# Patient Record
Sex: FEMALE | Race: WHITE | NOT HISPANIC OR LATINO | Employment: OTHER | URBAN - METROPOLITAN AREA
[De-identification: names, ages, dates, MRNs, and addresses within clinical notes are randomized per-mention and may not be internally consistent; named-entity substitution may affect disease eponyms.]

---

## 2020-03-02 ENCOUNTER — LAB (OUTPATIENT)
Dept: LAB | Facility: HOSPITAL | Age: 70
End: 2020-03-02

## 2020-03-02 ENCOUNTER — HOSPITAL ENCOUNTER (OUTPATIENT)
Dept: GENERAL RADIOLOGY | Facility: HOSPITAL | Age: 70
Discharge: HOME | End: 2020-03-02

## 2020-03-02 ENCOUNTER — HOSPITAL ENCOUNTER (OUTPATIENT)
Dept: CARDIOLOGY | Facility: HOSPITAL | Age: 70
Discharge: HOME | End: 2020-03-02
Admitting: FAMILY MEDICINE

## 2020-03-02 ENCOUNTER — OFFICE VISIT (OUTPATIENT)
Dept: FAMILY MEDICINE CLINIC | Facility: CLINIC | Age: 70
End: 2020-03-02

## 2020-03-02 VITALS
TEMPERATURE: 97.9 F | WEIGHT: 128.6 LBS | HEIGHT: 68 IN | RESPIRATION RATE: 18 BRPM | BODY MASS INDEX: 19.49 KG/M2 | HEART RATE: 71 BPM | OXYGEN SATURATION: 98 % | DIASTOLIC BLOOD PRESSURE: 86 MMHG | SYSTOLIC BLOOD PRESSURE: 136 MMHG

## 2020-03-02 DIAGNOSIS — I10 ESSENTIAL HYPERTENSION: ICD-10-CM

## 2020-03-02 DIAGNOSIS — M19.90 ARTHRITIS: ICD-10-CM

## 2020-03-02 DIAGNOSIS — I10 ESSENTIAL HYPERTENSION: Primary | ICD-10-CM

## 2020-03-02 LAB
ALBUMIN SERPL-MCNC: 4.3 G/DL (ref 3.5–5.2)
ALBUMIN/GLOB SERPL: 1.8 G/DL
ALP SERPL-CCNC: 54 U/L (ref 39–117)
ALT SERPL W P-5'-P-CCNC: 17 U/L (ref 1–33)
ANION GAP SERPL CALCULATED.3IONS-SCNC: 12.3 MMOL/L (ref 5–15)
AST SERPL-CCNC: 23 U/L (ref 1–32)
BASOPHILS # BLD AUTO: 0.05 10*3/MM3 (ref 0–0.2)
BASOPHILS NFR BLD AUTO: 0.7 % (ref 0–1.5)
BILIRUB SERPL-MCNC: 0.2 MG/DL (ref 0.2–1.2)
BUN BLD-MCNC: 21 MG/DL (ref 8–23)
BUN/CREAT SERPL: 22.3 (ref 7–25)
CALCIUM SPEC-SCNC: 9 MG/DL (ref 8.6–10.5)
CHLORIDE SERPL-SCNC: 100 MMOL/L (ref 98–107)
CHOLEST SERPL-MCNC: 157 MG/DL (ref 0–200)
CO2 SERPL-SCNC: 26.7 MMOL/L (ref 22–29)
CREAT BLD-MCNC: 0.94 MG/DL (ref 0.57–1)
DEPRECATED RDW RBC AUTO: 42.6 FL (ref 37–54)
EOSINOPHIL # BLD AUTO: 0.13 10*3/MM3 (ref 0–0.4)
EOSINOPHIL NFR BLD AUTO: 1.8 % (ref 0.3–6.2)
ERYTHROCYTE [DISTWIDTH] IN BLOOD BY AUTOMATED COUNT: 11.8 % (ref 12.3–15.4)
GFR SERPL CREATININE-BSD FRML MDRD: 59 ML/MIN/1.73
GLOBULIN UR ELPH-MCNC: 2.4 GM/DL
GLUCOSE BLD-MCNC: 88 MG/DL (ref 65–99)
HCT VFR BLD AUTO: 35.1 % (ref 34–46.6)
HDLC SERPL-MCNC: 55 MG/DL (ref 40–60)
HGB BLD-MCNC: 12.1 G/DL (ref 12–15.9)
IMM GRANULOCYTES # BLD AUTO: 0 10*3/MM3 (ref 0–0.05)
IMM GRANULOCYTES NFR BLD AUTO: 0 % (ref 0–0.5)
LDLC SERPL CALC-MCNC: 69 MG/DL (ref 0–100)
LDLC/HDLC SERPL: 1.25 {RATIO}
LYMPHOCYTES # BLD AUTO: 2.28 10*3/MM3 (ref 0.7–3.1)
LYMPHOCYTES NFR BLD AUTO: 32.3 % (ref 19.6–45.3)
MCH RBC QN AUTO: 33.7 PG (ref 26.6–33)
MCHC RBC AUTO-ENTMCNC: 34.5 G/DL (ref 31.5–35.7)
MCV RBC AUTO: 97.8 FL (ref 79–97)
MONOCYTES # BLD AUTO: 0.64 10*3/MM3 (ref 0.1–0.9)
MONOCYTES NFR BLD AUTO: 9.1 % (ref 5–12)
NEUTROPHILS # BLD AUTO: 3.95 10*3/MM3 (ref 1.7–7)
NEUTROPHILS NFR BLD AUTO: 56.1 % (ref 42.7–76)
NRBC BLD AUTO-RTO: 0 /100 WBC (ref 0–0.2)
PLATELET # BLD AUTO: 295 10*3/MM3 (ref 140–450)
PMV BLD AUTO: 9.9 FL (ref 6–12)
POTASSIUM BLD-SCNC: 4.4 MMOL/L (ref 3.5–5.2)
PROT SERPL-MCNC: 6.7 G/DL (ref 6–8.5)
RBC # BLD AUTO: 3.59 10*6/MM3 (ref 3.77–5.28)
SODIUM BLD-SCNC: 139 MMOL/L (ref 136–145)
TRIGL SERPL-MCNC: 167 MG/DL (ref 0–150)
VLDLC SERPL-MCNC: 33.4 MG/DL (ref 5–40)
WBC NRBC COR # BLD: 7.05 10*3/MM3 (ref 3.4–10.8)

## 2020-03-02 PROCEDURE — 93005 ELECTROCARDIOGRAM TRACING: CPT | Performed by: FAMILY MEDICINE

## 2020-03-02 PROCEDURE — 99203 OFFICE O/P NEW LOW 30 MIN: CPT | Performed by: FAMILY MEDICINE

## 2020-03-02 PROCEDURE — 36415 COLL VENOUS BLD VENIPUNCTURE: CPT

## 2020-03-02 PROCEDURE — 80061 LIPID PANEL: CPT

## 2020-03-02 PROCEDURE — 71046 X-RAY EXAM CHEST 2 VIEWS: CPT

## 2020-03-02 PROCEDURE — 85025 COMPLETE CBC W/AUTO DIFF WBC: CPT

## 2020-03-02 PROCEDURE — 80053 COMPREHEN METABOLIC PANEL: CPT

## 2020-03-02 RX ORDER — METOPROLOL TARTRATE 50 MG/1
50 TABLET, FILM COATED ORAL 2 TIMES DAILY
COMMUNITY
End: 2020-03-02 | Stop reason: SDUPTHER

## 2020-03-02 RX ORDER — METOPROLOL TARTRATE 50 MG/1
50 TABLET, FILM COATED ORAL 2 TIMES DAILY
Qty: 180 TABLET | Refills: 3 | Status: SHIPPED | OUTPATIENT
Start: 2020-03-02 | End: 2021-03-17

## 2020-03-02 NOTE — PROGRESS NOTES
"Subjective   Aparna Wilson is a 69 y.o. female.     Chief Complaint   Patient presents with   • Hypertension     Worse in the morning       HPI  Chief complaint: Hypertension, arthritis    The patient is a 69-year-old white female states that she has been having some headaches of late.  She took her blood pressure recently.  Her systolic pressure was 170.  Patient states that she started taking her 's metoprolol.  She has been states she has been taking 50 mg twice a day.  States that her headaches are better.  States she would like to continue with her antihypertensives.    Patient also has a history of arthritis for which she uses ibuprofen or Tylenol.  She states that it bothers her most in the morning.        The following portions of the patient's history were reviewed and updated as appropriate: allergies, current medications, past family history, past medical history, past social history, past surgical history and problem list.    Review of Systems   Constitutional: Negative for chills and fever.   HENT: Negative for congestion, sinus pressure and swollen glands.    Eyes: Negative for blurred vision and pain.   Respiratory: Negative for cough and shortness of breath.    Cardiovascular: Negative for chest pain and leg swelling.   Gastrointestinal: Negative for abdominal pain, nausea and indigestion.   Endocrine: Negative for cold intolerance, heat intolerance, polydipsia, polyphagia and polyuria.   Musculoskeletal: Positive for arthralgias.   Skin: Negative for dry skin, rash and bruise.   Neurological: Positive for headache. Negative for dizziness and syncope.   Psychiatric/Behavioral: Negative for dysphoric mood and stress.       Objective     /86 (BP Location: Left arm, Patient Position: Sitting, Cuff Size: Adult)   Pulse 71   Temp 97.9 °F (36.6 °C) (Oral)   Resp 18   Ht 171.5 cm (67.5\")   Wt 58.3 kg (128 lb 9.6 oz)   SpO2 98%   BMI 19.84 kg/m²     Physical Exam   Constitutional: She " is oriented to person, place, and time. She appears well-developed and well-nourished.   HENT:   Head: Normocephalic and atraumatic.   Eyes: Pupils are equal, round, and reactive to light. Conjunctivae and EOM are normal.   Neck: Normal range of motion. Neck supple.   Cardiovascular: Normal rate, regular rhythm, normal heart sounds and intact distal pulses.   Pulmonary/Chest: Effort normal and breath sounds normal.   Abdominal: Soft. Bowel sounds are normal.   Musculoskeletal: Normal range of motion.   Neurological: She is alert and oriented to person, place, and time.   Skin: Skin is warm and dry.   Psychiatric: She has a normal mood and affect. Her behavior is normal.   Nursing note and vitals reviewed.        Assessment/Plan   Aparna was seen today for hypertension.    Diagnoses and all orders for this visit:    Essential hypertension  -     CBC & Differential; Future  -     Comprehensive Metabolic Panel; Future  -     Lipid Panel; Future  -     ECG 12 Lead; Future  -     XR Chest PA & Lateral; Future    Arthritis    Other orders  -     metoprolol tartrate (LOPRESSOR) 50 MG tablet; Take 1 tablet by mouth 2 (Two) Times a Day.      Patient Instructions   Continue your current medications and treatment.    Have the follow up labs done and call for results.    Follow up in the office in 6 months.      Dontae Aranda Jr., MD    03/02/20

## 2020-03-08 PROCEDURE — 93010 ELECTROCARDIOGRAM REPORT: CPT | Performed by: INTERNAL MEDICINE

## 2020-03-13 ENCOUNTER — TELEPHONE (OUTPATIENT)
Dept: FAMILY MEDICINE CLINIC | Facility: CLINIC | Age: 70
End: 2020-03-13

## 2020-09-23 ENCOUNTER — OFFICE VISIT (OUTPATIENT)
Dept: FAMILY MEDICINE CLINIC | Facility: CLINIC | Age: 70
End: 2020-09-23

## 2020-09-23 VITALS
HEART RATE: 56 BPM | HEIGHT: 68 IN | SYSTOLIC BLOOD PRESSURE: 130 MMHG | DIASTOLIC BLOOD PRESSURE: 83 MMHG | TEMPERATURE: 97.7 F | WEIGHT: 137 LBS | RESPIRATION RATE: 20 BRPM | BODY MASS INDEX: 20.76 KG/M2 | OXYGEN SATURATION: 100 %

## 2020-09-23 DIAGNOSIS — M19.90 ARTHRITIS: ICD-10-CM

## 2020-09-23 DIAGNOSIS — Z00.00 ENCOUNTER FOR MEDICARE ANNUAL WELLNESS EXAM: ICD-10-CM

## 2020-09-23 DIAGNOSIS — I10 ESSENTIAL HYPERTENSION: Primary | ICD-10-CM

## 2020-09-23 DIAGNOSIS — S00.81XA ABRASION OF FACE, INITIAL ENCOUNTER: ICD-10-CM
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
## 2020-09-23 PROCEDURE — 99213 OFFICE O/P EST LOW 20 MIN: CPT | Performed by: FAMILY MEDICINE

## 2020-09-23 PROCEDURE — G0438 PPPS, INITIAL VISIT: HCPCS | Performed by: FAMILY MEDICINE

## 2020-09-23 NOTE — PATIENT INSTRUCTIONS
Continue your current medications and treatment.    Follow up in the office in 1 year.    Get a mammogram.

## 2020-09-23 NOTE — PROGRESS NOTES
The ABCs of the Annual Wellness Visit  Initial Medicare Wellness Visit    Chief Complaint   Patient presents with   • Medicare Wellness-Initial Visit   • Hypertension   • Arthritis       Subjective   History of Present Illness:  Aparna Wilson is a 70 y.o. female who presents for an Initial Medicare Wellness Visit.    HEALTH RISK ASSESSMENT    Recent Hospitalizations:  No hospitalization(s) within the last year.    Current Medical Providers:  Patient Care Team:  Dontae Aranda Jr., MD as PCP - General (Family Medicine)  Dontae Aranda Jr., MD as PCP - Claims Attributed    Smoking Status:  Social History     Tobacco Use   Smoking Status Former Smoker   Smokeless Tobacco Never Used       Alcohol Consumption:  Social History     Substance and Sexual Activity   Alcohol Use Defer       Depression Screen:   PHQ-2/PHQ-9 Depression Screening 9/23/2020   Little interest or pleasure in doing things 0   Feeling down, depressed, or hopeless 0   Total Score 0       Fall Risk Screen:  WENDY Fall Risk Assessment was completed, and patient is at HIGH risk for falls. Assessment completed on:9/23/2020    Health Habits and Functional and Cognitive Screening:  Functional & Cognitive Status 9/23/2020   Do you have difficulty preparing food and eating? No   Do you have difficulty bathing yourself, getting dressed or grooming yourself? No   Do you have difficulty using the toilet? No   Do you have difficulty moving around from place to place? No   Do you have trouble with steps or getting out of a bed or a chair? No   Current Diet Well Balanced Diet   Dental Exam Up to date   Exercise (times per week) 7 times per week   Current Exercise Activities Include Yard Work   Do you need help using the phone?  No   Are you deaf or do you have serious difficulty hearing?  No   Do you need help with transportation? No   Do you need help shopping? No   Do you need help preparing meals?  No   Do you need help with housework?  No   Do you need  help with laundry? No   Do you need help taking your medications? No   Do you need help managing money? No   Do you ever drive or ride in a car without wearing a seat belt? No   Have you felt unusual stress, anger or loneliness in the last month? No   Who do you live with? Spouse   If you need help, do you have trouble finding someone available to you? No   Have you been bothered in the last four weeks by sexual problems? No   Do you have difficulty concentrating, remembering or making decisions? No         Does the patient have evidence of cognitive impairment? No    Asprin use counseling:Does not need ASA (and currently is not on it)    Age-appropriate Screening Schedule:  Refer to the list below for future screening recommendations based on patient's age, sex and/or medical conditions. Orders for these recommended tests are listed in the plan section. The patient has been provided with a written plan.    Health Maintenance   Topic Date Due   • MAMMOGRAM  09/23/2020 (Originally 7/25/2018)   • TDAP/TD VACCINES (1 - Tdap) 09/23/2020 (Originally 6/5/1969)   • ZOSTER VACCINE (1 of 2) 03/02/2021 (Originally 6/5/2000)   • INFLUENZA VACCINE  09/23/2021 (Originally 8/1/2020)   • COLONOSCOPY  03/02/2026          The following portions of the patient's history were reviewed and updated as appropriate: allergies, current medications, past family history, past medical history, past social history, past surgical history and problem list.    Outpatient Medications Prior to Visit   Medication Sig Dispense Refill   • metoprolol tartrate (LOPRESSOR) 50 MG tablet Take 1 tablet by mouth 2 (Two) Times a Day. 180 tablet 3     No facility-administered medications prior to visit.        Patient Active Problem List   Diagnosis   • Essential hypertension   • Arthritis   • Facial abrasion   • Fall       Advanced Care Planning:  ACP discussion was held with the patient during this visit. Patient does not have an advance directive,  "information provided.    Review of Systems    Compared to one year ago, the patient feels her physical health is the same.  Compared to one year ago, the patient feels her mental health is the same.    Reviewed chart for potential of high risk medication in the elderly: yes  Reviewed chart for potential of harmful drug interactions in the elderly:yes    Objective         Vitals:    09/23/20 1405   BP: 130/83   BP Location: Left arm   Patient Position: Sitting   Cuff Size: Adult   Pulse: 56   Resp: 20   Temp: 97.7 °F (36.5 °C)   TempSrc: Infrared   SpO2: 100%   Weight: 62.1 kg (137 lb)   Height: 171.5 cm (67.5\")       Body mass index is 21.14 kg/m².  Discussed the patient's BMI with her. The BMI is in the acceptable range.    Physical Exam  Vitals signs and nursing note reviewed.   Constitutional:       Appearance: Normal appearance. She is well-developed and normal weight.   HENT:      Head: Normocephalic and atraumatic.      Nose: Nose normal.      Mouth/Throat:      Mouth: Mucous membranes are dry.      Pharynx: Oropharynx is clear.   Eyes:      Extraocular Movements: Extraocular movements intact.      Pupils: Pupils are equal, round, and reactive to light.   Neck:      Musculoskeletal: Normal range of motion and neck supple.   Cardiovascular:      Rate and Rhythm: Normal rate and regular rhythm.      Pulses: Normal pulses.      Heart sounds: Normal heart sounds.   Pulmonary:      Effort: Pulmonary effort is normal.      Breath sounds: Normal breath sounds.   Abdominal:      General: Abdomen is flat. Bowel sounds are normal.      Palpations: Abdomen is soft.   Musculoskeletal: Normal range of motion.      Comments: Osteoarthritic changes of the hands and other joints   Skin:     General: Skin is warm and dry.   Neurological:      Mental Status: She is alert and oriented to person, place, and time.   Psychiatric:         Behavior: Behavior normal.         Thought Content: Thought content normal.         Judgment: " Judgment normal.               Assessment/Plan   Medicare Risks and Personalized Health Plan  CMS Preventative Services Quick Reference  Advance Directive Discussion    The above risks/problems have been discussed with the patient.  Pertinent information has been shared with the patient in the After Visit Summary.  Follow up plans and orders are seen below in the Assessment/Plan Section.    Diagnoses and all orders for this visit:    1. Encounter for Medicare annual wellness exam (Primary)    2. Abrasion of face, initial encounter    3. Arthritis    4. Essential hypertension      Follow Up:  No follow-ups on file.     An After Visit Summary and PPPS were given to the patient.

## 2020-09-23 NOTE — PROGRESS NOTES
"Subjective   Aparna Wilson is a 70 y.o. female.     Chief Complaint   Patient presents with   • Medicare Wellness-Initial Visit   • Hypertension   • Arthritis       HPI  Chief complaint: Hypertension, arthritis    The patient is a 70-year-old white female comes in for follow-up and maintenance of her current problems which include    1.  Hypertension-stable-patient on metoprolol 50 mg twice a day.  She denied headache lightheadedness dizziness or chest pain.    2.  Arthritis-stable-patient is currently on over-the-counter anti-inflammatories.  Patient has arthritis involving all of her joints including her hands.  She states that the medications help with her joint pain.    3.  Facial abrasion-new-patient recently walked out in her yard in the dark and tripped over weedeater that she left out.  She fell on her face.  She sustained a facial abrasion.  She states that she subluxed her front teeth.  She is can have to have these removed.  She is being followed by dentist.        The following portions of the patient's history were reviewed and updated as appropriate: allergies, current medications, past family history, past medical history, past social history, past surgical history and problem list.    Review of Systems    Objective     /83 (BP Location: Left arm, Patient Position: Sitting, Cuff Size: Adult)   Pulse 56   Temp 97.7 °F (36.5 °C) (Infrared)   Resp 20   Ht 171.5 cm (67.5\")   Wt 62.1 kg (137 lb)   SpO2 100%   BMI 21.14 kg/m²     Physical Exam  Vitals signs and nursing note reviewed.   Constitutional:       Appearance: Normal appearance. She is well-developed and normal weight.   HENT:      Head: Normocephalic and atraumatic.      Nose: Nose normal.      Mouth/Throat:      Mouth: Mucous membranes are dry.      Pharynx: Oropharynx is clear.   Eyes:      Extraocular Movements: Extraocular movements intact.      Pupils: Pupils are equal, round, and reactive to light.   Neck:      " Musculoskeletal: Normal range of motion and neck supple.   Cardiovascular:      Rate and Rhythm: Normal rate and regular rhythm.      Heart sounds: Normal heart sounds.   Pulmonary:      Effort: Pulmonary effort is normal.      Breath sounds: Normal breath sounds.   Abdominal:      General: Abdomen is flat. Bowel sounds are normal.      Palpations: Abdomen is soft.   Musculoskeletal: Normal range of motion.      Comments: Arthritic changes of the hands and other joints   Skin:     General: Skin is warm and dry.      Comments: Abrasions of the upper lip.   Neurological:      Mental Status: She is alert and oriented to person, place, and time.   Psychiatric:         Behavior: Behavior normal.         Thought Content: Thought content normal.         Judgment: Judgment normal.           Assessment/Plan   Aparna was seen today for medicare wellness-initial visit, hypertension and arthritis.    Diagnoses and all orders for this visit:    Essential hypertension    Abrasion of face, initial encounter    Arthritis    Encounter for Medicare annual wellness exam      Patient Instructions   Continue your current medications and treatment.    Follow up in the office in 1 year.    Get a mammogram.        Dontae Aranda Jr., MD    09/23/20

## 2021-03-17 RX ORDER — METOPROLOL TARTRATE 50 MG/1
TABLET, FILM COATED ORAL
Qty: 180 TABLET | Refills: 0 | Status: SHIPPED | OUTPATIENT
Start: 2021-03-17 | End: 2021-06-22

## 2021-03-24 ENCOUNTER — LAB (OUTPATIENT)
Dept: LAB | Facility: HOSPITAL | Age: 71
End: 2021-03-24

## 2021-03-24 ENCOUNTER — OFFICE VISIT (OUTPATIENT)
Dept: FAMILY MEDICINE CLINIC | Facility: CLINIC | Age: 71
End: 2021-03-24

## 2021-03-24 VITALS
BODY MASS INDEX: 21.04 KG/M2 | TEMPERATURE: 97.1 F | HEART RATE: 67 BPM | OXYGEN SATURATION: 98 % | HEIGHT: 68 IN | WEIGHT: 138.8 LBS | SYSTOLIC BLOOD PRESSURE: 136 MMHG | RESPIRATION RATE: 20 BRPM | DIASTOLIC BLOOD PRESSURE: 83 MMHG

## 2021-03-24 DIAGNOSIS — I10 ESSENTIAL HYPERTENSION: Primary | ICD-10-CM

## 2021-03-24 DIAGNOSIS — Z13.9 ENCOUNTER FOR HEALTH-RELATED SCREENING: ICD-10-CM
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
## 2021-03-24 PROBLEM — S00.81XA FACIAL ABRASION: Status: RESOLVED | Noted: 2020-09-23 | Resolved: 2021-03-24

## 2021-03-24 LAB
ANION GAP SERPL CALCULATED.3IONS-SCNC: 6.1 MMOL/L (ref 5–15)
BUN SERPL-MCNC: 19 MG/DL (ref 8–23)
BUN/CREAT SERPL: 18.8 (ref 7–25)
CALCIUM SPEC-SCNC: 8.7 MG/DL (ref 8.6–10.5)
CHLORIDE SERPL-SCNC: 102 MMOL/L (ref 98–107)
CHOLEST SERPL-MCNC: 170 MG/DL (ref 0–200)
CO2 SERPL-SCNC: 27.9 MMOL/L (ref 22–29)
CREAT SERPL-MCNC: 1.01 MG/DL (ref 0.57–1)
GFR SERPL CREATININE-BSD FRML MDRD: 54 ML/MIN/1.73
GLUCOSE SERPL-MCNC: 90 MG/DL (ref 65–99)
HDLC SERPL-MCNC: 48 MG/DL (ref 40–60)
LDLC SERPL CALC-MCNC: 90 MG/DL (ref 0–100)
LDLC/HDLC SERPL: 1.76 {RATIO}
POTASSIUM SERPL-SCNC: 4.8 MMOL/L (ref 3.5–5.2)
SODIUM SERPL-SCNC: 136 MMOL/L (ref 136–145)
TRIGL SERPL-MCNC: 188 MG/DL (ref 0–150)
VLDLC SERPL-MCNC: 32 MG/DL (ref 5–40)

## 2021-03-24 PROCEDURE — 80061 LIPID PANEL: CPT

## 2021-03-24 PROCEDURE — 36415 COLL VENOUS BLD VENIPUNCTURE: CPT

## 2021-03-24 PROCEDURE — 80048 BASIC METABOLIC PNL TOTAL CA: CPT

## 2021-03-24 PROCEDURE — 99213 OFFICE O/P EST LOW 20 MIN: CPT | Performed by: FAMILY MEDICINE

## 2021-03-24 NOTE — PATIENT INSTRUCTIONS
Continue your current medications and treatment.    Have the follow up labs and the mammogram.    Follow up in the office in 6 months..

## 2021-03-24 NOTE — PROGRESS NOTES
"Subjective   Aparna Wilson is a 70 y.o. female.     Chief Complaint   Patient presents with   • Hypertension     6 month followup       HPI  Chief complaint: Hypertension    Patient is a 70-year-old white female comes in for follow-up and maintenance of her current problems which include    1.  Hypertension stable-patient on metoprolol 50 mg twice a day.  She denied headache lightheadedness dizziness or chest pain.        The following portions of the patient's history were reviewed and updated as appropriate: allergies, current medications, past family history, past medical history, past social history, past surgical history and problem list.    Review of Systems    Objective     /83 (BP Location: Left arm, Patient Position: Sitting, Cuff Size: Adult)   Pulse 67   Temp 97.1 °F (36.2 °C) (Infrared)   Resp 20   Ht 171.5 cm (67.5\")   Wt 63 kg (138 lb 12.8 oz)   SpO2 98%   BMI 21.42 kg/m²     Physical Exam  Vitals and nursing note reviewed.   Constitutional:       Appearance: She is well-developed and normal weight.   HENT:      Head: Normocephalic and atraumatic.      Nose: Nose normal.      Mouth/Throat:      Mouth: Mucous membranes are moist.      Pharynx: Oropharynx is clear.   Eyes:      Conjunctiva/sclera: Conjunctivae normal.      Pupils: Pupils are equal, round, and reactive to light.   Cardiovascular:      Rate and Rhythm: Normal rate and regular rhythm.      Pulses: Normal pulses.      Heart sounds: Normal heart sounds.   Pulmonary:      Effort: Pulmonary effort is normal.      Breath sounds: Normal breath sounds.   Abdominal:      General: Abdomen is flat. Bowel sounds are normal.      Palpations: Abdomen is soft.   Musculoskeletal:         General: Normal range of motion.      Cervical back: Normal range of motion and neck supple.   Skin:     General: Skin is warm and dry.   Neurological:      Mental Status: She is alert and oriented to person, place, and time.   Psychiatric:         " Behavior: Behavior normal.         Thought Content: Thought content normal.         Judgment: Judgment normal.       Lipid Panel (03/02/2020 13:06)  Comprehensive Metabolic Panel (03/02/2020 13:06)  CBC & Differential (03/02/2020 13:06)      Assessment/Plan   Diagnoses and all orders for this visit:    1. Essential hypertension (Primary)    2. Encounter for health-related screening  -     Mammo Screening Digital Tomosynthesis Bilateral With CAD; Future      Patient Instructions   Continue your current medications and treatment.    Have the follow up labs and the mammogram.    Follow up in the office in 6 months..      Dontae Aranda Jr., MD    03/24/21

## 2021-04-16 ENCOUNTER — DOCUMENTATION (OUTPATIENT)
Dept: FAMILY MEDICINE CLINIC | Facility: CLINIC | Age: 71
End: 2021-04-16

## 2021-04-16 NOTE — PROGRESS NOTES
Called pt to see if she has scheduled mammogram, LVM for pt to call office and let us know if she has scheduled this. Ok hub to read

## 2021-06-22 RX ORDER — METOPROLOL TARTRATE 50 MG/1
TABLET, FILM COATED ORAL
Qty: 180 TABLET | Refills: 0 | Status: SHIPPED | OUTPATIENT
Start: 2021-06-22 | End: 2021-09-28

## 2021-09-28 RX ORDER — METOPROLOL TARTRATE 50 MG/1
TABLET, FILM COATED ORAL
Qty: 180 TABLET | Refills: 0 | Status: SHIPPED | OUTPATIENT
Start: 2021-09-28 | End: 2021-12-30

## 2021-09-29 ENCOUNTER — OFFICE VISIT (OUTPATIENT)
Dept: FAMILY MEDICINE CLINIC | Facility: CLINIC | Age: 71
End: 2021-09-29

## 2021-09-29 VITALS
HEIGHT: 68 IN | TEMPERATURE: 96.2 F | DIASTOLIC BLOOD PRESSURE: 78 MMHG | RESPIRATION RATE: 16 BRPM | BODY MASS INDEX: 19.97 KG/M2 | SYSTOLIC BLOOD PRESSURE: 136 MMHG | OXYGEN SATURATION: 98 % | WEIGHT: 131.8 LBS | HEART RATE: 67 BPM

## 2021-09-29 DIAGNOSIS — Z13.9 ENCOUNTER FOR HEALTH-RELATED SCREENING: ICD-10-CM

## 2021-09-29 DIAGNOSIS — I10 ESSENTIAL HYPERTENSION: Primary | ICD-10-CM

## 2021-09-29 DIAGNOSIS — Z00.00 ENCOUNTER FOR ANNUAL WELLNESS EXAM IN MEDICARE PATIENT: ICD-10-CM

## 2021-09-29 PROCEDURE — G0439 PPPS, SUBSEQ VISIT: HCPCS | Performed by: FAMILY MEDICINE

## 2021-09-29 PROCEDURE — 99213 OFFICE O/P EST LOW 20 MIN: CPT | Performed by: FAMILY MEDICINE

## 2021-09-29 NOTE — PATIENT INSTRUCTIONS
Continue your current medications and treatment.    Follow up in the office in 6 months.    Laboratory testing at that time.    Get a mammogram.

## 2021-09-29 NOTE — PROGRESS NOTES
The ABCs of the Annual Wellness Visit  Subsequent Medicare Wellness Visit    Chief Complaint   Patient presents with   • Medicare Wellness-subsequent   • Hypertension     6 month followup      Subjective    History of Present Illness:  Aparna Wilson is a 71 y.o. female who presents for a Subsequent Medicare Wellness Visit.    The following portions of the patient's history were reviewed and   updated as appropriate: allergies, current medications, past family history, past medical history, past social history, past surgical history and problem list.    Compared to one year ago, the patient feels her physical   health is the same.    Compared to one year ago, the patient feels her mental   health is the same.    Recent Hospitalizations:  She was not admitted to the hospital during the last year.       Current Medical Providers:  Patient Care Team:  Dontae Aranda Jr., MD as PCP - General (Family Medicine)    Outpatient Medications Prior to Visit   Medication Sig Dispense Refill   • metoprolol tartrate (LOPRESSOR) 50 MG tablet TAKE ONE TABLET BY MOUTH TWICE DAILY 180 tablet 0     No facility-administered medications prior to visit.       No opioid medication identified on active medication list. I have reviewed chart for other potential  high risk medication/s and harmful drug interactions in the elderly.          Aspirin is not on active medication list.  Aspirin use is not indicated based on review of current medical condition/s. Risk of harm outweighs potential benefits.  .    Patient Active Problem List   Diagnosis   • Essential hypertension   • Arthritis     Advance Care Planning  Advance Directive is not on file.  ACP discussion was held with the patient during this visit. Patient has an advance directive (not in EMR), copy requested.          Objective    Vitals:    09/29/21 1425   BP: 136/78   BP Location: Left arm   Patient Position: Sitting   Cuff Size: Adult   Pulse: 67   Resp: 16   Temp: 96.2 °F (35.7  "°C)   TempSrc: Infrared   SpO2: 98%   Weight: 59.8 kg (131 lb 12.8 oz)   Height: 171.5 cm (67.5\")   PainSc: 0-No pain     BMI Readings from Last 1 Encounters:   21 20.34 kg/m²   BMI is within normal parameters. No follow-up required.    Does the patient have evidence of cognitive impairment? No    Physical Exam  Vitals and nursing note reviewed.   Constitutional:       Appearance: She is well-developed and normal weight.   HENT:      Head: Normocephalic and atraumatic.      Nose: Nose normal.      Mouth/Throat:      Mouth: Mucous membranes are moist.      Pharynx: Oropharynx is clear.   Eyes:      Extraocular Movements: Extraocular movements intact.      Conjunctiva/sclera: Conjunctivae normal.      Pupils: Pupils are equal, round, and reactive to light.   Cardiovascular:      Rate and Rhythm: Normal rate and regular rhythm.      Heart sounds: Normal heart sounds.   Pulmonary:      Effort: Pulmonary effort is normal.      Breath sounds: Normal breath sounds.   Abdominal:      General: Abdomen is flat. Bowel sounds are normal.      Palpations: Abdomen is soft.   Musculoskeletal:         General: Normal range of motion.      Cervical back: Neck supple.   Skin:     General: Skin is warm and dry.   Neurological:      Mental Status: She is alert and oriented to person, place, and time.   Psychiatric:         Behavior: Behavior normal.         Thought Content: Thought content normal.         Judgment: Judgment normal.                 HEALTH RISK ASSESSMENT    Smoking Status:  Social History     Tobacco Use   Smoking Status Former Smoker   • Packs/day: 0.25   • Years: 5.00   • Pack years: 1.25   • Types: Cigarettes   • Quit date:    • Years since quittin.7   Smokeless Tobacco Never Used     Alcohol Consumption:  Social History     Substance and Sexual Activity   Alcohol Use Defer     Fall Risk Screen:    STEADI Fall Risk Assessment was completed, and patient is at LOW risk for falls.Assessment completed " on:9/29/2021    Depression Screening:  PHQ-2/PHQ-9 Depression Screening 9/29/2021   Little interest or pleasure in doing things 0   Feeling down, depressed, or hopeless 0   Total Score 0       Health Habits and Functional and Cognitive Screening:  Functional & Cognitive Status 9/29/2021   Do you have difficulty preparing food and eating? No   Do you have difficulty bathing yourself, getting dressed or grooming yourself? No   Do you have difficulty using the toilet? No   Do you have difficulty moving around from place to place? No   Do you have trouble with steps or getting out of a bed or a chair? No   Current Diet Well Balanced Diet   Dental Exam Up to date   Eye Exam Not up to date   Exercise (times per week) 7 times per week   Current Exercises Include Walking   Current Exercise Activities Include -   Do you need help using the phone?  No   Are you deaf or do you have serious difficulty hearing?  No   Do you need help with transportation? No   Do you need help shopping? No   Do you need help preparing meals?  No   Do you need help with housework?  No   Do you need help with laundry? No   Do you need help taking your medications? No   Do you need help managing money? No   Do you ever drive or ride in a car without wearing a seat belt? No   Have you felt unusual stress, anger or loneliness in the last month? No   Who do you live with? Spouse   If you need help, do you have trouble finding someone available to you? No   Have you been bothered in the last four weeks by sexual problems? No   Do you have difficulty concentrating, remembering or making decisions? No       Age-appropriate Screening Schedule:  Refer to the list below for future screening recommendations based on patient's age, sex and/or medical conditions. Orders for these recommended tests are listed in the plan section. The patient has been provided with a written plan.    Health Maintenance   Topic Date Due   • DXA SCAN  Never done   • MAMMOGRAM   09/29/2021 (Originally 7/25/2018)   • ZOSTER VACCINE (1 of 2) 03/24/2022 (Originally 6/5/2000)   • INFLUENZA VACCINE  10/01/2021              Assessment/Plan   CMS Preventative Services Quick Reference  Risk Factors Identified During Encounter  Immunizations Discussed/Encouraged (specific Immunizations; Td, Influenza, Pneumococcal 23, Shingrix and COVID19  The above risks/problems have been discussed with the patient.  Follow up actions/plans if indicated are seen below in the Assessment/Plan Section.  Pertinent information has been shared with the patient in the After Visit Summary.    Diagnoses and all orders for this visit:    1. Encounter for annual wellness exam in Medicare patient (Primary)        Follow Up:   Return in about 6 months (around 3/29/2022).     An After Visit Summary and PPPS were made available to the patient.

## 2021-09-29 NOTE — PROGRESS NOTES
"Subjective   Aparna Wilson is a 71 y.o. female.     Chief Complaint   Patient presents with   • Medicare Wellness-subsequent   • Hypertension     6 month followup       HPI  Chief complaint: Hypertension    Patient is a 71-year-old white female comes in for follow-up of maintenance of her current problems which include     1. hypertension-stable-patient on Lopressor 50 mg twice a day.  She denies headache lightheadedness dizziness or chest pain.        The following portions of the patient's history were reviewed and updated as appropriate: allergies, current medications, past family history, past medical history, past social history, past surgical history and problem list.    Review of Systems    Objective     /78 (BP Location: Left arm, Patient Position: Sitting, Cuff Size: Adult)   Pulse 67   Temp 96.2 °F (35.7 °C) (Infrared)   Resp 16   Ht 171.5 cm (67.5\")   Wt 59.8 kg (131 lb 12.8 oz)   SpO2 98%   BMI 20.34 kg/m²     Physical Exam  Vitals and nursing note reviewed.   Constitutional:       Appearance: She is well-developed and normal weight.   HENT:      Head: Normocephalic and atraumatic.      Nose: Nose normal.      Mouth/Throat:      Mouth: Mucous membranes are moist.      Pharynx: Oropharynx is clear.   Eyes:      Extraocular Movements: Extraocular movements intact.      Conjunctiva/sclera: Conjunctivae normal.      Pupils: Pupils are equal, round, and reactive to light.   Cardiovascular:      Rate and Rhythm: Normal rate and regular rhythm.      Heart sounds: Normal heart sounds.   Pulmonary:      Effort: Pulmonary effort is normal.      Breath sounds: Normal breath sounds.   Abdominal:      General: Bowel sounds are normal.      Palpations: Abdomen is soft.   Musculoskeletal:         General: Normal range of motion.      Cervical back: Neck supple.   Skin:     General: Skin is warm and dry.   Neurological:      Mental Status: She is alert and oriented to person, place, and time. "   Psychiatric:         Behavior: Behavior normal.         Thought Content: Thought content normal.         Judgment: Judgment normal.           Assessment/Plan   Diagnoses and all orders for this visit:    1. Essential hypertension (Primary)    2. Encounter for annual wellness exam in Medicare patient    3. Encounter for health-related screening  -     Mammo Screening Digital Tomosynthesis Bilateral With CAD; Future      Patient Instructions   Continue your current medications and treatment.    Follow up in the office in 6 months.    Laboratory testing at that time.    Get a mammogram.      Dontae Aranda Jr., MD    09/29/21

## 2021-12-30 RX ORDER — METOPROLOL TARTRATE 50 MG/1
TABLET, FILM COATED ORAL
Qty: 180 TABLET | Refills: 0 | Status: SHIPPED | OUTPATIENT
Start: 2021-12-30 | End: 2022-03-23

## 2022-03-23 RX ORDER — METOPROLOL TARTRATE 50 MG/1
TABLET, FILM COATED ORAL
Qty: 180 TABLET | Refills: 0 | Status: SHIPPED | OUTPATIENT
Start: 2022-03-23 | End: 2022-07-06

## 2022-07-06 RX ORDER — METOPROLOL TARTRATE 50 MG/1
TABLET, FILM COATED ORAL
Qty: 180 TABLET | Refills: 0 | Status: SHIPPED | OUTPATIENT
Start: 2022-07-06 | End: 2022-10-06

## 2022-10-05 ENCOUNTER — OFFICE VISIT (OUTPATIENT)
Dept: FAMILY MEDICINE CLINIC | Facility: CLINIC | Age: 72
End: 2022-10-05

## 2022-10-05 VITALS
RESPIRATION RATE: 16 BRPM | SYSTOLIC BLOOD PRESSURE: 135 MMHG | HEART RATE: 71 BPM | HEIGHT: 68 IN | WEIGHT: 130 LBS | DIASTOLIC BLOOD PRESSURE: 79 MMHG | OXYGEN SATURATION: 98 % | BODY MASS INDEX: 19.7 KG/M2 | TEMPERATURE: 96.9 F

## 2022-10-05 DIAGNOSIS — Z13.9 ENCOUNTER FOR HEALTH-RELATED SCREENING: Primary | ICD-10-CM

## 2022-10-05 DIAGNOSIS — I10 ESSENTIAL HYPERTENSION: ICD-10-CM

## 2022-10-05 PROCEDURE — 1170F FXNL STATUS ASSESSED: CPT | Performed by: FAMILY MEDICINE

## 2022-10-05 PROCEDURE — G0439 PPPS, SUBSEQ VISIT: HCPCS | Performed by: FAMILY MEDICINE

## 2022-10-05 PROCEDURE — 1126F AMNT PAIN NOTED NONE PRSNT: CPT | Performed by: FAMILY MEDICINE

## 2022-10-05 PROCEDURE — 1160F RVW MEDS BY RX/DR IN RCRD: CPT | Performed by: FAMILY MEDICINE

## 2022-10-05 NOTE — PROGRESS NOTES
The ABCs of the Annual Wellness Visit  Subsequent Medicare Wellness Visit    Chief Complaint   Patient presents with   • Medicare Wellness-subsequent      Subjective    History of Present Illness:  Aparna Wilson is a 72 y.o. female who presents for a Subsequent Medicare Wellness Visit.    The patient has history of hypertension and osteoarthritis.  Her blood pressures controlled.  Arthritis is progressing.  She is going to try Tylenol or ibuprofen or Aleve for the pain.    The following portions of the patient's history were reviewed and   updated as appropriate: allergies, current medications, past family history, past medical history, past social history, past surgical history and problem list.    Compared to one year ago, the patient feels her physical   health is the same.    Compared to one year ago, the patient feels her mental   health is the same.    Recent Hospitalizations:  She was not admitted to the hospital during the last year.       Current Medical Providers:  Patient Care Team:  Dontae Aranda Jr., MD as PCP - General (Family Medicine)    Outpatient Medications Prior to Visit   Medication Sig Dispense Refill   • metoprolol tartrate (LOPRESSOR) 50 MG tablet TAKE ONE TABLET BY MOUTH TWICE DAILY 180 tablet 0     No facility-administered medications prior to visit.       No opioid medication identified on active medication list. I have reviewed chart for other potential  high risk medication/s and harmful drug interactions in the elderly.          Aspirin is not on active medication list.  Aspirin use is not indicated based on review of current medical condition/s. Risk of harm outweighs potential benefits.  .    Patient Active Problem List   Diagnosis   • Essential hypertension   • Arthritis     Advance Care Planning  Advance Directive is not on file.  ACP discussion was held with the patient during this visit. Patient has an advance directive (not in EMR), copy requested.          Objective   "  Vitals:    10/05/22 1453   BP: 135/79   BP Location: Left arm   Patient Position: Sitting   Cuff Size: Adult   Pulse: 71   Resp: 16   Temp: 96.9 °F (36.1 °C)   TempSrc: Infrared   SpO2: 98%   Weight: 59 kg (130 lb)   Height: 171.5 cm (67.5\")   PainSc: 0-No pain     Estimated body mass index is 20.06 kg/m² as calculated from the following:    Height as of this encounter: 171.5 cm (67.5\").    Weight as of this encounter: 59 kg (130 lb).    BMI is within normal parameters. No other follow-up for BMI required.      Does the patient have evidence of cognitive impairment? No    Physical Exam  Vitals and nursing note reviewed.   Constitutional:       Appearance: She is well-developed.   HENT:      Head: Normocephalic and atraumatic.   Eyes:      Pupils: Pupils are equal, round, and reactive to light.   Cardiovascular:      Rate and Rhythm: Normal rate and regular rhythm.      Pulses: Normal pulses.      Heart sounds: Normal heart sounds. No murmur heard.    No friction rub. No gallop.   Pulmonary:      Effort: Pulmonary effort is normal.      Breath sounds: Normal breath sounds.   Abdominal:      General: Bowel sounds are normal.      Palpations: Abdomen is soft.   Musculoskeletal:         General: Normal range of motion.      Cervical back: Neck supple.      Comments: Patient has typical changes of wear-and-tear arthritis involving upper extremities hands knees   Skin:     General: Skin is warm and dry.   Neurological:      Mental Status: She is alert and oriented to person, place, and time.   Psychiatric:         Behavior: Behavior normal.         Thought Content: Thought content normal.         Judgment: Judgment normal.                 HEALTH RISK ASSESSMENT    Smoking Status:  Social History     Tobacco Use   Smoking Status Former Smoker   • Packs/day: 0.25   • Years: 5.00   • Pack years: 1.25   • Types: Cigarettes   • Quit date:    • Years since quittin.7   Smokeless Tobacco Never Used     Alcohol " Consumption:  Social History     Substance and Sexual Activity   Alcohol Use Defer     Fall Risk Screen:    RONALDADI Fall Risk Assessment was completed, and patient is at LOW risk for falls.Assessment completed on:10/5/2022    Depression Screening:  PHQ-2/PHQ-9 Depression Screening 10/5/2022   Retired PHQ-9 Total Score -   Retired Total Score -   Little Interest or Pleasure in Doing Things 0-->not at all   Feeling Down, Depressed or Hopeless 0-->not at all   PHQ-9: Brief Depression Severity Measure Score 0       Health Habits and Functional and Cognitive Screening:  Functional & Cognitive Status 10/5/2022   Do you have difficulty preparing food and eating? No   Do you have difficulty bathing yourself, getting dressed or grooming yourself? No   Do you have difficulty using the toilet? No   Do you have difficulty moving around from place to place? No   Do you have trouble with steps or getting out of a bed or a chair? No   Current Diet Well Balanced Diet   Dental Exam Up to date   Eye Exam Up to date   Exercise (times per week) 7 times per week   Current Exercises Include Walking   Current Exercise Activities Include -   Do you need help using the phone?  No   Are you deaf or do you have serious difficulty hearing?  No   Do you need help with transportation? No   Do you need help shopping? No   Do you need help preparing meals?  No   Do you need help with housework?  No   Do you need help with laundry? No   Do you need help taking your medications? No   Do you need help managing money? No   Do you ever drive or ride in a car without wearing a seat belt? No   Have you felt unusual stress, anger or loneliness in the last month? No   Who do you live with? Spouse   If you need help, do you have trouble finding someone available to you? No   Have you been bothered in the last four weeks by sexual problems? No   Do you have difficulty concentrating, remembering or making decisions? No       Age-appropriate Screening  Schedule:  Refer to the list below for future screening recommendations based on patient's age, sex and/or medical conditions. Orders for these recommended tests are listed in the plan section. The patient has been provided with a written plan.    Health Maintenance   Topic Date Due   • INFLUENZA VACCINE  08/01/2022   • MAMMOGRAM  10/05/2022 (Originally 7/25/2018)   • DXA SCAN  10/05/2022 (Originally 1950)   • ZOSTER VACCINE (1 of 2) 10/05/2023 (Originally 6/5/2000)              Assessment & Plan   CMS Preventative Services Quick Reference  Risk Factors Identified During Encounter  Immunizations Discussed/Encouraged (specific Immunizations; Td, Influenza, Pneumococcal 23, Prevnar 20 (Pneumococcal 20-valent conjugate), Vaxneuvance (Pneumococcal 15-valent conjugate), Shingrix and COVID19  The above risks/problems have been discussed with the patient.  Follow up actions/plans if indicated are seen below in the Assessment/Plan Section.  Pertinent information has been shared with the patient in the After Visit Summary.    Diagnoses and all orders for this visit:    1. Encounter for health-related screening (Primary)  -     Mammo Screening Digital Tomosynthesis Bilateral With CAD; Future        Follow Up:   Return in about 6 months (around 4/5/2023).     An After Visit Summary and PPPS were made available to the patient.

## 2022-10-05 NOTE — PATIENT INSTRUCTIONS
Continue your current medications and treatment.    Get a mammogram.    Follow up in the office in 6 months.

## 2022-10-06 RX ORDER — METOPROLOL TARTRATE 50 MG/1
TABLET, FILM COATED ORAL
Qty: 180 TABLET | Refills: 0 | Status: SHIPPED | OUTPATIENT
Start: 2022-10-06 | End: 2023-01-09

## 2023-01-09 RX ORDER — METOPROLOL TARTRATE 50 MG/1
TABLET, FILM COATED ORAL
Qty: 180 TABLET | Refills: 0 | Status: SHIPPED | OUTPATIENT
Start: 2023-01-09 | End: 2023-04-06

## 2023-04-06 RX ORDER — METOPROLOL TARTRATE 50 MG/1
TABLET, FILM COATED ORAL
Qty: 180 TABLET | Refills: 0 | Status: SHIPPED | OUTPATIENT
Start: 2023-04-06

## 2023-07-26 ENCOUNTER — TELEPHONE (OUTPATIENT)
Dept: FAMILY MEDICINE CLINIC | Facility: CLINIC | Age: 73
End: 2023-07-26
Payer: MEDICARE

## 2023-07-26 NOTE — TELEPHONE ENCOUNTER
Caller: Aparna Wilson    Relationship to patient: Self    Best call back number: [phone]    Date of exposure: UNKNOWN    Date of positive COVID19 test: 07/25/23    Date if possible COVID19 exposure: UNKNOWN    COVID19 symptoms:     Date of initial quarantine: 07/26/23    Additional information or concerns:     What is the patients preferred pharmacy:   [facility] - [place] - [address] - [phone] [facility] [phone] FX

## 2023-07-26 NOTE — TELEPHONE ENCOUNTER
I spoke with the patient.    She was advised that treatment of COVID is mainly symptomatic.  I recommended rest fluids Tylenol or ibuprofen for aches and fever.  She would like to try Paxlovid.  She is advised to go to the ER with worsening shortness of breath lightheadedness dizziness fever chills.

## 2023-07-28 ENCOUNTER — TELEPHONE (OUTPATIENT)
Dept: FAMILY MEDICINE CLINIC | Facility: CLINIC | Age: 73
End: 2023-07-28
Payer: MEDICARE

## 2023-07-28 RX ORDER — PREDNISONE 10 MG/1
TABLET ORAL
Qty: 20 TABLET | Refills: 0 | Status: SHIPPED | OUTPATIENT
Start: 2023-07-28

## 2023-07-28 NOTE — TELEPHONE ENCOUNTER
I spoke with the patient.  She had not started Paxlovid.  I recommended prednisone.  She sounded much better today than yesterday.

## 2023-07-28 NOTE — TELEPHONE ENCOUNTER
Caller: Aparna Wilson    Relationship: Self    Best call back number: [phone]    What medication are you requesting: SOMETHING FOR RASH    What are your current symptoms: RED, ITCHY RASH ALL OVER BODY, SHE SAYS SHE THINKS IT IS DUE TO COVID-19    How long have you been experiencing symptoms: 2 DAYS    Have you had these symptoms before:    [x] Yes  [] No    Have you been treated for these symptoms before:   [x] Yes  [] No    If a prescription is needed, what is your preferred pharmacy and phone number: [facility] - [facility] [place] - [address] - [phone] [facility] [phone]      Additional notes:    PATIENT CALLED AND SAID SHE THINKS SHE HAS A RASH RELATED TO COVID-19    SHE SAID SHE HAS TRIED BENADRYL BUT IT IS NOT WORKING AND SHE CAN'T COVER HER WHOLE BODY WITH HYDROCORTISONE CREAM TO EFFECTIVELY TREAT IT    SHE IS WANTING TO SEE IF SOMETHING CAN BE SENT INTO THE PHARMACY FOR HER

## 2023-08-02 RX ORDER — METOPROLOL TARTRATE 50 MG/1
TABLET, FILM COATED ORAL
Qty: 180 TABLET | Refills: 0 | Status: SHIPPED | OUTPATIENT
Start: 2023-08-02

## 2023-08-21 ENCOUNTER — TELEPHONE (OUTPATIENT)
Dept: FAMILY MEDICINE CLINIC | Facility: CLINIC | Age: 73
End: 2023-08-21
Payer: MEDICARE

## 2023-08-21 NOTE — TELEPHONE ENCOUNTER
Caller: Aparna Wilson    Relationship: Self    Best call back number: [phone]    What is the best time to reach you: ANYTIME    Who are you requesting to speak with (clinical staff, provider,  specific staff member): CLINICAL STAFF    Do you know the name of the person who called: PATIENT     What was the call regarding: PATIENT CALLED TO RESCHEDULE HER MEDICARE WELLNESS WITH DR. NATHAN ROBBINS ADVISED THERE WERE NO OPENINGS    CHECKING TO SEE WHAT SHE NEEDS TO DO, IF SHE SHOULD SEE KEISHA REYES OR ESTABLISH WITH A DIFFERENT PROVIDER    Is it okay if the provider responds through MyChart: NO

## 2023-08-21 NOTE — TELEPHONE ENCOUNTER
I spoke with the patient.  She is going to start seeing Dr. Lynen at [facility].

## 2023-08-21 NOTE — TELEPHONE ENCOUNTER
I spoke with the patient and advised her that Dr. Aranda does not have any openings due to retiring end of September. Advised patient all appointments after 9/28/2023 were cancelled due to he will not be here. Patient understood. Patient states she is going to call Dr. Lynne at [facility] and schedule an appointment with her.

## 2023-10-09 RX ORDER — METOPROLOL TARTRATE 50 MG/1
TABLET, FILM COATED ORAL
Qty: 60 TABLET | Refills: 0 | Status: SHIPPED | OUTPATIENT
Start: 2023-10-09

## 2024-11-13 ENCOUNTER — OFFICE VISIT (OUTPATIENT)
Dept: CARDIOLOGY | Facility: CLINIC | Age: 74
End: 2024-11-13
Payer: MEDICARE

## 2024-11-13 VITALS
DIASTOLIC BLOOD PRESSURE: 80 MMHG | WEIGHT: 144 LBS | SYSTOLIC BLOOD PRESSURE: 110 MMHG | HEART RATE: 52 BPM | OXYGEN SATURATION: 99 % | HEIGHT: 67 IN | BODY MASS INDEX: 22.6 KG/M2

## 2024-11-13 DIAGNOSIS — I10 ESSENTIAL HYPERTENSION: ICD-10-CM

## 2024-11-13 DIAGNOSIS — E78.1 HYPERTRIGLYCERIDEMIA: ICD-10-CM

## 2024-11-13 DIAGNOSIS — R07.9 CHRONIC CHEST PAIN WITH HIGH RISK FOR CAD: Primary | ICD-10-CM

## 2024-11-13 DIAGNOSIS — G89.29 CHRONIC CHEST PAIN WITH HIGH RISK FOR CAD: Primary | ICD-10-CM

## 2024-11-13 DIAGNOSIS — Z91.89 CHRONIC CHEST PAIN WITH HIGH RISK FOR CAD: Primary | ICD-10-CM

## 2024-11-13 RX ORDER — CYANOCOBALAMIN (VITAMIN B-12) 1000 MCG
TABLET, SUBLINGUAL SUBLINGUAL
COMMUNITY

## 2024-11-13 RX ORDER — ASPIRIN 81 MG/1
81 TABLET ORAL DAILY
COMMUNITY

## 2024-11-13 RX ORDER — PROMETHAZINE HYDROCHLORIDE 25 MG/1
TABLET ORAL
COMMUNITY
End: 2024-11-13

## 2024-11-13 RX ORDER — LORAZEPAM 0.5 MG/1
TABLET ORAL
COMMUNITY

## 2024-11-13 RX ORDER — METHOTREXATE 2.5 MG/1
10 TABLET ORAL
COMMUNITY

## 2024-11-13 RX ORDER — DAPAGLIFLOZIN 10 MG/1
1 TABLET, FILM COATED ORAL DAILY
COMMUNITY

## 2024-11-13 RX ORDER — LOSARTAN POTASSIUM 50 MG/1
TABLET ORAL
COMMUNITY
End: 2024-11-13

## 2024-11-13 RX ORDER — PSEUDOEPHEDRINE HCL 30 MG
100 TABLET ORAL
COMMUNITY

## 2024-11-13 RX ORDER — FOLIC ACID 1 MG/1
TABLET ORAL
COMMUNITY

## 2024-11-13 RX ORDER — CETIRIZINE HYDROCHLORIDE 10 MG/1
10 TABLET ORAL DAILY
COMMUNITY

## 2024-11-13 NOTE — PROGRESS NOTES
Cardiology Office Consultation      Encounter Date:  2024    PATIENT IDENTIFICATION    Name: Aparna Wilson  Age: 74 y.o. Sex: female : 1950  MRN: [id number]    Reason For Consultation:  Chest pain    History of Present Illness:  Patient is a 74-year-old female with history of hypertension, hypertriglyceridemia, who comes to the cardiology office to establish care due to chest pain.    Patient reports that over the past 1 month she has experienced exertional shortness of breath and chest tightness.  The symptoms last for few minutes and improved with rest.  She is physically active at work and has always been physically active throughout her life, and never experienced similar symptoms before.  Of note, patient recently was found to have acute kidney failure by blood work obtained by her PCP (baseline creatinine 1.1, 2 weeks ago her creatinine bumped up to 1.5).  She was referred to nephrology evaluation and placed on Jardiance and aspirin 81.    Her current medications include metoprolol 50 mg daily, recently placed on aspirin 81 mg and Jardiance 10 mg daily.    I have personally reviewed patient's medical chart including external notes, laboratory results and imaging studies.      Assessment & Plan    Impressions:  1. Essential hypertension (Primary)  -Blood pressure well-controlled today, 110/80.  Continue metoprolol 50 mg daily.  2. Hypertriglyceridemia  -Encourage patient to engage in lifestyle changes, no need for pharmacological therapy at this time.  3. Chronic chest pain with high risk for CAD  Given episodes of chest tightness, shortness of breath, and her risk factors including hypertension and being former smoker, we will obtain nuclear stress test to rule out Myocardial ischemia and TTE for evaluation of structural heart disease.  Ordered BMP and proBNP     Diagnoses and all orders for this visit:    1. Chronic chest pain with high risk for CAD (Primary)  -     Adult Transthoracic Echo  "Complete W/ Cont if Necessary Per Protocol; Future  -     Stress Test With Myocardial Perfusion One Day; Future  -     proBNP; Future  -     Basic Metabolic Panel; Future  -     ECG 12 Lead    2. Essential hypertension  -     ECG 12 Lead    3. Hypertriglyceridemia  -     ECG 12 Lead         Objective:    Vitals:  Vitals:    11/13/24 0822   BP: 110/80   BP Location: Left arm   Patient Position: Sitting   Pulse: 52   SpO2: 99%   Weight: 65.3 kg (144 lb)   Height: 170.2 cm (67\")     Body mass index is 22.55 kg/m².    Physical Exam:  General: Alert, cooperative, no distress, appears stated age  Lungs:  Clear to auscultation bilaterally, no wheezes, rhonchi or rales are noted  Chest wall: No tenderness  Heart::  Regular rate and rhythm, S1 and S2 normal, no murmur.  No rub or gallop  Abdomen: Soft, nontender, nondistended, bowel sounds active  Extremities: No cyanosis, clubbing, or edema  Pulses: 2+ and symmetric all extremities  Neuro/psych: No gross focal deficits      Allergies:  Allergies   Allergen Reactions    Hepatitis B Virus Vaccines Swelling    Penicillins Hives    Tetanus Toxoids Swelling    Nickel Rash       Medication Review:     Current Outpatient Medications:     aspirin 81 MG EC tablet, Take 1 tablet by mouth Daily., Disp: , Rfl:     cetirizine (zyrTEC) 10 MG tablet, Take 1 tablet by mouth Daily., Disp: , Rfl:     Cholecalciferol 125 MCG (5000 UT) tablet, Take 1 tablet by mouth Daily., Disp: , Rfl:     Cyanocobalamin (Vitamin B-12) 500 MCG sublingual tablet, Place  under the tongue., Disp: , Rfl:     docusate sodium 100 MG capsule, Take 1 capsule by mouth., Disp: , Rfl:     Farxiga 10 MG tablet, Take 10 mg by mouth Daily., Disp: , Rfl:     metoprolol tartrate (LOPRESSOR) 50 MG tablet, TAKE ONE TABLET BY MOUTH TWICE DAILY, Disp: 60 tablet, Rfl: 0    folic acid (FOLVITE) 1 MG tablet, , Disp: , Rfl:     LORazepam (ATIVAN) 0.5 MG tablet, take 1 tab 30 minutes before flying (Patient not taking: Reported on " 2024), Disp: , Rfl:     methotrexate 2.5 MG tablet, Take 4 tablets by mouth. (Patient not taking: Reported on 2024), Disp: , Rfl:     Family History:  Family History   Problem Relation Age of Onset    No Known Problems Mother     Heart failure Father        Past Medical History:  Past Medical History:   Diagnosis Date    Herpes zoster     Hypertension 2019    Menopausal syndrome        Past surgical History:  Past Surgical History:   Procedure Laterality Date    REPLACEMENT TOTAL KNEE Left     THUMB AMPUTATION      ROPING    TOE AMPUTATION          TOTAL ABDOMINAL HYSTERECTOMY WITH SALPINGO OOPHORECTOMY         Social History:  Social History     Socioeconomic History    Marital status:    Tobacco Use    Smoking status: Former     Current packs/day: 0.00     Average packs/day: 0.4 packs/day for 12.6 years (5.1 ttl pk-yrs)     Types: Cigarettes     Start date: 1970     Quit date: 1980     Years since quittin.8     Passive exposure: Past    Smokeless tobacco: Never   Vaping Use    Vaping status: Never Used   Substance and Sexual Activity    Alcohol use: Never    Drug use: Never    Sexual activity: Not Currently     Partners: Male     Birth control/protection: Hysterectomy       Review of Systems:  The following systems were reviewed as they relate to the cardiovascular system: Constitutional, Eyes, ENT, Cardiovascular, Respiratory, Gastrointestinal, Integumentary, Neurological, Psychiatric, Hematologic, Endocrine, Musculoskeletal, and Genitourinary. The pertinent cardiovascular findings are reported above with all other cardiovascular points within those systems being negative.    Diagnostic Study Review:     Current Electrocardiogram:    ECG 12 Lead    Date/Time: 2024 9:25 AM  Performed by: Matteo Mccloud MD    Authorized by: Matteo Mccloud MD  Comparison: not compared with previous ECG   Previous ECG: no previous ECG  "available  Rhythm: sinus rhythm  Rate: normal  ST Segments: ST segments normal  T Waves: T waves normal  QRS axis: normal    Clinical impression: normal ECG        Laboratory Data:  Lab Results   Component Value Date    GLUCOSE 90 03/24/2021    BUN 19 03/24/2021    CREATININE 1.01 (H) 03/24/2021    EGFRIFNONA 54 (L) 03/24/2021    BCR 18.8 03/24/2021    K 4.8 03/24/2021    CO2 27.9 03/24/2021    CALCIUM 8.7 03/24/2021    ALBUMIN 4.30 03/02/2020    AST 23 03/02/2020    ALT 17 03/02/2020     Lab Results   Component Value Date    GLUCOSE 90 03/24/2021    CALCIUM 8.7 03/24/2021     03/24/2021    K 4.8 03/24/2021    CO2 27.9 03/24/2021     03/24/2021    BUN 19 03/24/2021    CREATININE 1.01 (H) 03/24/2021    EGFRIFNONA 54 (L) 03/24/2021    BCR 18.8 03/24/2021    ANIONGAP 6.1 03/24/2021     Lab Results   Component Value Date    WBC 7.04 02/13/2024    HGB 11.5 (L) 02/13/2024    HCT 32.8 (L) 02/13/2024    MCV 94.3 02/13/2024     02/13/2024     Lab Results   Component Value Date    CHOL 170 03/24/2021    TRIG 188 (H) 03/24/2021    HDL 48 03/24/2021    LDL 90 03/24/2021     No results found for: \"HGBA1C\"  No results found for: \"INR\", \"PROTIME\"    Most Recent Echo:       Most Recent Stress Test:       Most Recent Cardiac Catheterization:   No results found for this or any previous visit.       NOTE: The following portions of the patient's note were reviewed, confirmed and/or updated this visit as appropriate: History of present illness/Interval history, physical examination, assessment & plan, allergies, current medications, past family history, past medical history, past social history, past surgical history and problem list.  "

## 2024-11-13 NOTE — PATIENT INSTRUCTIONS
Thank you for following up with cardiology today.  We encourage you to continue taking her medications and engaging healthy lifestyle habits including physical activity and diet with low sodium levels.  We will obtain an echocardiogram and stress test to better evaluate your heart.  If you have any questions please let us know.

## 2024-11-25 ENCOUNTER — LAB (OUTPATIENT)
Dept: LAB | Facility: HOSPITAL | Age: 74
End: 2024-11-25
Payer: MEDICARE

## 2024-11-25 DIAGNOSIS — Z91.89 CHRONIC CHEST PAIN WITH HIGH RISK FOR CAD: ICD-10-CM

## 2024-11-25 DIAGNOSIS — R07.9 CHRONIC CHEST PAIN WITH HIGH RISK FOR CAD: ICD-10-CM

## 2024-11-25 DIAGNOSIS — G89.29 CHRONIC CHEST PAIN WITH HIGH RISK FOR CAD: ICD-10-CM

## 2024-11-25 LAB
ANION GAP SERPL CALCULATED.3IONS-SCNC: 7.9 MMOL/L (ref 5–15)
BUN SERPL-MCNC: 26 MG/DL (ref 8–23)
BUN/CREAT SERPL: 16.5 (ref 7–25)
CALCIUM SPEC-SCNC: 9.2 MG/DL (ref 8.6–10.5)
CHLORIDE SERPL-SCNC: 107 MMOL/L (ref 98–107)
CO2 SERPL-SCNC: 23.1 MMOL/L (ref 22–29)
CREAT SERPL-MCNC: 1.58 MG/DL (ref 0.57–1)
EGFRCR SERPLBLD CKD-EPI 2021: 34.2 ML/MIN/1.73
GLUCOSE SERPL-MCNC: 88 MG/DL (ref 65–99)
NT-PROBNP SERPL-MCNC: 417 PG/ML (ref 0–900)
POTASSIUM SERPL-SCNC: 5 MMOL/L (ref 3.5–5.2)
SODIUM SERPL-SCNC: 138 MMOL/L (ref 136–145)

## 2024-11-25 PROCEDURE — 83880 ASSAY OF NATRIURETIC PEPTIDE: CPT

## 2024-11-25 PROCEDURE — 80048 BASIC METABOLIC PNL TOTAL CA: CPT

## 2024-11-25 PROCEDURE — 36415 COLL VENOUS BLD VENIPUNCTURE: CPT

## 2024-12-09 ENCOUNTER — HOSPITAL ENCOUNTER (OUTPATIENT)
Dept: CARDIOLOGY | Facility: HOSPITAL | Age: 74
Discharge: HOME | End: 2024-12-09
Payer: MEDICARE

## 2024-12-09 VITALS
BODY MASS INDEX: 22.6 KG/M2 | HEIGHT: 67 IN | SYSTOLIC BLOOD PRESSURE: 170 MMHG | DIASTOLIC BLOOD PRESSURE: 84 MMHG | WEIGHT: 144 LBS

## 2024-12-09 DIAGNOSIS — G89.29 CHRONIC CHEST PAIN WITH HIGH RISK FOR CAD: ICD-10-CM

## 2024-12-09 DIAGNOSIS — Z91.89 CHRONIC CHEST PAIN WITH HIGH RISK FOR CAD: ICD-10-CM

## 2024-12-09 DIAGNOSIS — R07.9 CHRONIC CHEST PAIN WITH HIGH RISK FOR CAD: ICD-10-CM

## 2024-12-09 LAB
BH CV ECHO LEFT VENTRICLE GLOBAL LONGITUDINAL STRAIN: 20.1 %
BH CV ECHO MEAS - ACS: 1.94 CM
BH CV ECHO MEAS - AO MAX PG: 6.3 MMHG
BH CV ECHO MEAS - AO MEAN PG: 3.8 MMHG
BH CV ECHO MEAS - AO ROOT DIAM: 3.3 CM
BH CV ECHO MEAS - AO V2 MAX: 125 CM/SEC
BH CV ECHO MEAS - AO V2 VTI: 30.6 CM
BH CV ECHO MEAS - AVA(I,D): 1.96 CM2
BH CV ECHO MEAS - EDV(CUBED): 81.3 ML
BH CV ECHO MEAS - EDV(MOD-SP4): 64.5 ML
BH CV ECHO MEAS - EF(MOD-BP): 51 %
BH CV ECHO MEAS - EF(MOD-SP4): 51.1 %
BH CV ECHO MEAS - ESV(CUBED): 25.9 ML
BH CV ECHO MEAS - ESV(MOD-SP4): 31.6 ML
BH CV ECHO MEAS - FS: 31.7 %
BH CV ECHO MEAS - IVS/LVPW: 1.17 CM
BH CV ECHO MEAS - IVSD: 0.98 CM
BH CV ECHO MEAS - LA DIMENSION: 3.2 CM
BH CV ECHO MEAS - LV DIASTOLIC VOL/BSA (35-75): 36.7 CM2
BH CV ECHO MEAS - LV MASS(C)D: 126.1 GRAMS
BH CV ECHO MEAS - LV MAX PG: 2.9 MMHG
BH CV ECHO MEAS - LV MEAN PG: 1.59 MMHG
BH CV ECHO MEAS - LV SYSTOLIC VOL/BSA (12-30): 17.9 CM2
BH CV ECHO MEAS - LV V1 MAX: 84.8 CM/SEC
BH CV ECHO MEAS - LV V1 VTI: 19.7 CM
BH CV ECHO MEAS - LVIDD: 4.3 CM
BH CV ECHO MEAS - LVIDS: 3 CM
BH CV ECHO MEAS - LVOT AREA: 3 CM2
BH CV ECHO MEAS - LVOT DIAM: 1.97 CM
BH CV ECHO MEAS - LVPWD: 0.84 CM
BH CV ECHO MEAS - MR MAX PG: 163.1 MMHG
BH CV ECHO MEAS - MR MAX VEL: 638.5 CM/SEC
BH CV ECHO MEAS - MV A MAX VEL: 74.3 CM/SEC
BH CV ECHO MEAS - MV DEC SLOPE: 367.6 CM/SEC2
BH CV ECHO MEAS - MV DEC TIME: 0.14 SEC
BH CV ECHO MEAS - MV E MAX VEL: 50.6 CM/SEC
BH CV ECHO MEAS - MV E/A: 0.68
BH CV ECHO MEAS - MV MAX PG: 2.9 MMHG
BH CV ECHO MEAS - MV MEAN PG: 1.29 MMHG
BH CV ECHO MEAS - MV V2 VTI: 21.3 CM
BH CV ECHO MEAS - MVA(VTI): 2.8 CM2
BH CV ECHO MEAS - PA ACC TIME: 0.09 SEC
BH CV ECHO MEAS - PA V2 MAX: 68.3 CM/SEC
BH CV ECHO MEAS - PI END-D VEL: 91.3 CM/SEC
BH CV ECHO MEAS - PULM A REVS DUR: 0.12 SEC
BH CV ECHO MEAS - PULM A REVS VEL: 32.2 CM/SEC
BH CV ECHO MEAS - PULM DIAS VEL: 29.4 CM/SEC
BH CV ECHO MEAS - PULM S/D: 2.09
BH CV ECHO MEAS - PULM SYS VEL: 61.3 CM/SEC
BH CV ECHO MEAS - RAP SYSTOLE: 3 MMHG
BH CV ECHO MEAS - RV MAX PG: 1.74 MMHG
BH CV ECHO MEAS - RV V1 MAX: 66 CM/SEC
BH CV ECHO MEAS - RV V1 VTI: 15.1 CM
BH CV ECHO MEAS - RVDD: 3.5 CM
BH CV ECHO MEAS - RVSP: 84.2 MMHG
BH CV ECHO MEAS - SV(LVOT): 59.9 ML
BH CV ECHO MEAS - SV(MOD-SP4): 33 ML
BH CV ECHO MEAS - SVI(LVOT): 34.1 ML/M2
BH CV ECHO MEAS - SVI(MOD-SP4): 18.8 ML/M2
BH CV ECHO MEAS - TR MAX PG: 81.2 MMHG
BH CV ECHO MEAS - TR MAX VEL: 405.8 CM/SEC
BH CV REST NUCLEAR ISOTOPE DOSE: 10.5 MCI
BH CV STRESS DURATION MIN STAGE 1: 2
BH CV STRESS DURATION SEC STAGE 1: 50
BH CV STRESS GRADE STAGE 1: 10
BH CV STRESS HR STAGE 1: 141
BH CV STRESS METS STAGE 1: 5
BH CV STRESS NUCLEAR ISOTOPE DOSE: 30.8 MCI
BH CV STRESS PROTOCOL 1: NORMAL
BH CV STRESS RECOVERY BP: NORMAL MMHG
BH CV STRESS RECOVERY HR: 73 BPM
BH CV STRESS SPEED STAGE 1: 1.7
BH CV STRESS STAGE 1: 1
LV EF NUC BP: 60 %
MAXIMAL PREDICTED HEART RATE: 146 BPM
PERCENT MAX PREDICTED HR: 98.63 %
STRESS BASELINE BP: NORMAL MMHG
STRESS BASELINE HR: 65 BPM
STRESS PERCENT HR: 116 %
STRESS POST PEAK BP: NORMAL MMHG
STRESS POST PEAK HR: 144 BPM
STRESS TARGET HR: 124 BPM

## 2024-12-09 PROCEDURE — 78452 HT MUSCLE IMAGE SPECT MULT: CPT

## 2024-12-09 PROCEDURE — 93356 MYOCRD STRAIN IMG SPCKL TRCK: CPT

## 2024-12-09 PROCEDURE — 93306 TTE W/DOPPLER COMPLETE: CPT

## 2024-12-09 PROCEDURE — 34310000005 TECHNETIUM SESTAMIBI: Performed by: STUDENT IN AN ORGANIZED HEALTH CARE EDUCATION/TRAINING PROGRAM

## 2024-12-09 PROCEDURE — A9500 TC99M SESTAMIBI: HCPCS | Performed by: STUDENT IN AN ORGANIZED HEALTH CARE EDUCATION/TRAINING PROGRAM

## 2024-12-09 PROCEDURE — 93017 CV STRESS TEST TRACING ONLY: CPT

## 2024-12-09 RX ADMIN — TECHNETIUM TC 99M SESTAMIBI 1 DOSE: 1 INJECTION INTRAVENOUS at 11:52

## 2024-12-09 RX ADMIN — TECHNETIUM TC 99M SESTAMIBI 1 DOSE: 1 INJECTION INTRAVENOUS at 12:31

## 2024-12-23 ENCOUNTER — TRANSCRIBE ORDERS (OUTPATIENT)
Dept: ADMINISTRATIVE | Facility: HOSPITAL | Age: 74
End: 2024-12-23
Payer: MEDICARE

## 2024-12-23 DIAGNOSIS — N18.31 STAGE 3A CHRONIC KIDNEY DISEASE: Primary | ICD-10-CM

## 2025-01-14 ENCOUNTER — PREP FOR SURGERY (OUTPATIENT)
Dept: OTHER | Facility: HOSPITAL | Age: 75
End: 2025-01-14
Payer: MEDICARE

## 2025-01-14 ENCOUNTER — LAB (OUTPATIENT)
Dept: LAB | Facility: HOSPITAL | Age: 75
End: 2025-01-14
Payer: MEDICARE

## 2025-01-14 ENCOUNTER — HOSPITAL ENCOUNTER (OUTPATIENT)
Dept: ULTRASOUND IMAGING | Facility: HOSPITAL | Age: 75
Discharge: HOME | End: 2025-01-14
Payer: MEDICARE

## 2025-01-14 DIAGNOSIS — N18.31 STAGE 3A CHRONIC KIDNEY DISEASE: ICD-10-CM

## 2025-01-14 DIAGNOSIS — N18.32 CHRONIC KIDNEY DISEASE (CKD) STAGE G3B/A2, MODERATELY DECREASED GLOMERULAR FILTRATION RATE (GFR) BETWEEN 30-44 ML/MIN/1.73 SQUARE METER AND ALBUMINURIA CREATININE RATIO BETWEEN 30-299 MG/G (CMS*: Primary | ICD-10-CM

## 2025-01-14 DIAGNOSIS — N18.32 CHRONIC KIDNEY DISEASE (CKD) STAGE G3B/A2, MODERATELY DECREASED GLOMERULAR FILTRATION RATE (GFR) BETWEEN 30-44 ML/MIN/1.73 SQUARE METER AND ALBUMINURIA CREATININE RATIO BETWEEN 30-299 MG/G (CMS*: ICD-10-CM

## 2025-01-14 LAB
ALBUMIN SERPL-MCNC: 3.9 G/DL (ref 3.5–5.2)
ALBUMIN/GLOB SERPL: 1.3 G/DL
ALP SERPL-CCNC: 69 U/L (ref 39–117)
ALT SERPL W P-5'-P-CCNC: 8 U/L (ref 1–33)
ANION GAP SERPL CALCULATED.3IONS-SCNC: 11 MMOL/L (ref 5–15)
AST SERPL-CCNC: 20 U/L (ref 1–32)
BASOPHILS # BLD AUTO: 0.05 10*3/MM3 (ref 0–0.2)
BASOPHILS NFR BLD AUTO: 0.9 % (ref 0–1.5)
BILIRUB SERPL-MCNC: 0.2 MG/DL (ref 0–1.2)
BUN SERPL-MCNC: 15 MG/DL (ref 8–23)
BUN/CREAT SERPL: 11.9 (ref 7–25)
CALCIUM SPEC-SCNC: 9 MG/DL (ref 8.6–10.5)
CHLORIDE SERPL-SCNC: 104 MMOL/L (ref 98–107)
CO2 SERPL-SCNC: 25 MMOL/L (ref 22–29)
CREAT SERPL-MCNC: 1.26 MG/DL (ref 0.57–1)
CREAT UR-MCNC: 20.9 MG/DL
DEPRECATED RDW RBC AUTO: 46.4 FL (ref 37–54)
EGFRCR SERPLBLD CKD-EPI 2021: 44.9 ML/MIN/1.73
EOSINOPHIL # BLD AUTO: 0.1 10*3/MM3 (ref 0–0.4)
EOSINOPHIL NFR BLD AUTO: 1.9 % (ref 0.3–6.2)
ERYTHROCYTE [DISTWIDTH] IN BLOOD BY AUTOMATED COUNT: 12.8 % (ref 12.3–15.4)
GLOBULIN UR ELPH-MCNC: 2.9 GM/DL
GLUCOSE SERPL-MCNC: 87 MG/DL (ref 65–99)
HCT VFR BLD AUTO: 35.4 % (ref 34–46.6)
HGB BLD-MCNC: 11.3 G/DL (ref 12–15.9)
IMM GRANULOCYTES # BLD AUTO: 0.01 10*3/MM3 (ref 0–0.05)
IMM GRANULOCYTES NFR BLD AUTO: 0.2 % (ref 0–0.5)
LYMPHOCYTES # BLD AUTO: 1.29 10*3/MM3 (ref 0.7–3.1)
LYMPHOCYTES NFR BLD AUTO: 24.4 % (ref 19.6–45.3)
MCH RBC QN AUTO: 31.4 PG (ref 26.6–33)
MCHC RBC AUTO-ENTMCNC: 31.9 G/DL (ref 31.5–35.7)
MCV RBC AUTO: 98.3 FL (ref 79–97)
MONOCYTES # BLD AUTO: 0.38 10*3/MM3 (ref 0.1–0.9)
MONOCYTES NFR BLD AUTO: 7.2 % (ref 5–12)
NEUTROPHILS NFR BLD AUTO: 3.45 10*3/MM3 (ref 1.7–7)
NEUTROPHILS NFR BLD AUTO: 65.4 % (ref 42.7–76)
NRBC BLD AUTO-RTO: 0 /100 WBC (ref 0–0.2)
PHOSPHATE SERPL-MCNC: 3.6 MG/DL (ref 2.5–4.5)
PLATELET # BLD AUTO: 333 10*3/MM3 (ref 140–450)
PMV BLD AUTO: 9.6 FL (ref 6–12)
POTASSIUM SERPL-SCNC: 4.5 MMOL/L (ref 3.5–5.2)
PROT ?TM UR-MCNC: <4 MG/DL
PROT SERPL-MCNC: 6.8 G/DL (ref 6–8.5)
PTH-INTACT SERPL-MCNC: 27.7 PG/ML (ref 15–65)
RBC # BLD AUTO: 3.6 10*6/MM3 (ref 3.77–5.28)
SODIUM SERPL-SCNC: 140 MMOL/L (ref 136–145)
WBC NRBC COR # BLD AUTO: 5.28 10*3/MM3 (ref 3.4–10.8)

## 2025-01-14 PROCEDURE — 76775 US EXAM ABDO BACK WALL LIM: CPT

## 2025-01-14 PROCEDURE — 80053 COMPREHEN METABOLIC PANEL: CPT

## 2025-01-14 PROCEDURE — 36415 COLL VENOUS BLD VENIPUNCTURE: CPT

## 2025-01-14 PROCEDURE — 84100 ASSAY OF PHOSPHORUS: CPT

## 2025-01-14 PROCEDURE — 84156 ASSAY OF PROTEIN URINE: CPT

## 2025-01-14 PROCEDURE — 83970 ASSAY OF PARATHORMONE: CPT

## 2025-01-14 PROCEDURE — 85025 COMPLETE CBC W/AUTO DIFF WBC: CPT

## 2025-01-14 PROCEDURE — 82570 ASSAY OF URINE CREATININE: CPT

## 2025-01-21 ENCOUNTER — OFFICE VISIT (OUTPATIENT)
Dept: CARDIOLOGY | Facility: CLINIC | Age: 75
End: 2025-01-21
Payer: MEDICARE

## 2025-01-21 VITALS
SYSTOLIC BLOOD PRESSURE: 140 MMHG | BODY MASS INDEX: 22.44 KG/M2 | WEIGHT: 143 LBS | HEIGHT: 67 IN | HEART RATE: 68 BPM | OXYGEN SATURATION: 99 % | DIASTOLIC BLOOD PRESSURE: 82 MMHG

## 2025-01-21 DIAGNOSIS — N18.32 STAGE 3B CHRONIC KIDNEY DISEASE: ICD-10-CM

## 2025-01-21 DIAGNOSIS — E78.1 HYPERTRIGLYCERIDEMIA: ICD-10-CM

## 2025-01-21 DIAGNOSIS — Z87.891 FORMER SMOKER: ICD-10-CM

## 2025-01-21 DIAGNOSIS — I10 ESSENTIAL HYPERTENSION: Primary | ICD-10-CM

## 2025-01-21 NOTE — PATIENT INSTRUCTIONS
Thank you for following up with cardiology today.  We encourage you to continue taking her medications and engaging healthy lifestyle habits including physical activity and diet with low sodium levels.  If you have any questions please let us know.

## 2025-01-21 NOTE — PROGRESS NOTES
Cardiology Office Visit      Encounter Date:  2025    PATIENT IDENTIFICATION    Name: Aparna Wilson  Age: 74 y.o. Sex: female : 1950  MRN: [id number]    Reason For Followup:    Chest pain, elevated blood pressure    Brief Clinical History:  Patient is a 74-year-old female with hypertension, hypertriglyceridemia, CKD stage III, who comes to the cardiology office for follow-up.    Patient was seen back in 2024 when she experienced episodes of chest pain and shortness of breath.  We performed nuclear stress test at that showed no evidence of myocardial ischemia, and TTE showing preserved ejection fraction, mild mitral regurgitation.  In today's visit, patient reports feeling significantly better, denies any recurrent episodes of chest pain, shortness of breath, palpitations, dizziness, lightheadedness, presyncope or syncope.  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    Her current medications include Farxiga, metoprolol, cholecalciferol..    Recent blood work includes total cholesterol 170, LDL 90, triglycerides 188, creatinine 1.26    Prior cardiac workup  TTE 2024    Left ventricular systolic function is normal. Calculated left ventricular EF = 51%    Left ventricular diastolic function was normal.    Normal RV systolic function.    Mild mitral regurgitation.    Estimated right ventricular systolic pressure from tricuspid regurgitation is normal (<35 mmHg).    Nuclear stress test Dec 2024    Myocardial perfusion imaging indicates a normal myocardial perfusion study with no evidence of ischemia.    Left ventricular ejection fraction is normal (Calculated EF = 60%).    Findings consistent with a normal ECG stress test.    Mild breast attenuation artifact is present.    Low risk for ischemic heart  "disease.      Assessment & Plan    Impressions:  Hypertension  Mild hypertriglyceridemia  CKD stage III  Former smoker    Recommendations:  Continue metoprolol for hypertension, advised patient to monitor blood pressure at home.    Encouraged patient to remain physically active and healthier diet.  Patient following up with nephrology for CKD  Plan to repeat lipid panel at the next visit      There are no diagnoses linked to this encounter.      Objective:    Vitals:  Vitals:    01/21/25 1025   BP: 140/82   BP Location: Left arm   Patient Position: Sitting   Cuff Size: Adult   Pulse: 68   SpO2: 99%   Weight: 64.9 kg (143 lb)   Height: 170.2 cm (67\")     Body mass index is 22.4 kg/m².      Physical Exam:  General: Alert, cooperative, no distress, appears stated age  Lungs:  Clear to auscultation bilaterally, no wheezes, rhonchi or rales are noted  Chest wall: No tenderness  Heart::  Regular rate and rhythm, S1 and S2 normal, no murmur.  No rub or gallop  Abdomen: Soft, nontender, nondistended, bowel sounds active  Extremities: No cyanosis, clubbing, or edema  Pulses: 2+ and symmetric all extremities  Neuro/psych: No gross focal deficits        Allergies:  Allergies   Allergen Reactions    Hepatitis B Virus Vaccines Swelling    Penicillins Hives    Tetanus Toxoids Swelling    Nickel Rash       Medication Review:     Current Outpatient Medications:     Cholecalciferol 125 MCG (5000 UT) tablet, Take 1 tablet by mouth Daily., Disp: , Rfl:     Cyanocobalamin (Vitamin B-12) 500 MCG sublingual tablet, Place  under the tongue., Disp: , Rfl:     docusate sodium 100 MG capsule, Take 1 capsule by mouth., Disp: , Rfl:     Farxiga 10 MG tablet, Take 10 mg by mouth Daily., Disp: , Rfl:     metoprolol tartrate (LOPRESSOR) 50 MG tablet, TAKE ONE TABLET BY MOUTH TWICE DAILY, Disp: 60 tablet, Rfl: 0    Family History:  Family History   Problem Relation Age of Onset    No Known Problems Mother     Heart failure Father        Past " Medical History:  Past Medical History:   Diagnosis Date    Chronic kidney disease     Herpes zoster     Hypertension 2019    Menopausal syndrome        Past Surgical History:  Past Surgical History:   Procedure Laterality Date    REPLACEMENT TOTAL KNEE Left     THUMB AMPUTATION      ROPING    TOE AMPUTATION          TOTAL ABDOMINAL HYSTERECTOMY WITH SALPINGO OOPHORECTOMY         Social History:  Social History     Socioeconomic History    Marital status:    Tobacco Use    Smoking status: Former     Current packs/day: 0.00     Average packs/day: 0.4 packs/day for 17.6 years (6.3 ttl pk-yrs)     Types: Cigarettes     Start date: 1965     Quit date: 1985     Years since quittin.0     Passive exposure: Past    Smokeless tobacco: Never   Vaping Use    Vaping status: Never Used   Substance and Sexual Activity    Alcohol use: Never    Drug use: Never    Sexual activity: Not Currently     Partners: Male     Birth control/protection: Hysterectomy       Review of Systems:  The following systems were reviewed as they relate to the cardiovascular system: Constitutional, Eyes, ENT, Cardiovascular, Respiratory, Gastrointestinal, Integumentary, Neurological, Psychiatric, Hematologic, Endocrine, Musculoskeletal, and Genitourinary. The pertinent cardiovascular findings are reported above with all other cardiovascular points within those systems being negative.      Advance Care Planning   ACP discussion was declined by the patient. Patient has an advance directive (not in EMR), copy requested.       Diagnostic Study Review:       Laboratory Data:  Lab Results   Component Value Date    GLUCOSE 87 2025    BUN 15 2025    CREATININE 1.26 (H) 2025    EGFRIFNONA 54 (L) 2021    BCR 11.9 2025    K 4.5 2025    CO2 25.0 2025    CALCIUM 9.0 2025    ALBUMIN 3.9 2025    AST 20 2025    ALT 8 2025     Lab Results   Component Value Date    GLUCOSE  "87 01/14/2025    CALCIUM 9.0 01/14/2025     01/14/2025    K 4.5 01/14/2025    CO2 25.0 01/14/2025     01/14/2025    BUN 15 01/14/2025    CREATININE 1.26 (H) 01/14/2025    EGFRIFNONA 54 (L) 03/24/2021    BCR 11.9 01/14/2025    ANIONGAP 11.0 01/14/2025     Lab Results   Component Value Date    WBC 5.28 01/14/2025    HGB 11.3 (L) 01/14/2025    HCT 35.4 01/14/2025    MCV 98.3 (H) 01/14/2025     01/14/2025     Lab Results   Component Value Date    CHOL 170 03/24/2021    TRIG 188 (H) 03/24/2021    HDL 48 03/24/2021    LDL 90 03/24/2021     No results found for: \"HGBA1C\"  No results found for: \"INR\", \"PROTIME\"    Most Recent Echo:  Results for orders placed during the hospital encounter of 12/09/24    Adult Transthoracic Echo Complete W/ Cont if Necessary Per Protocol    Interpretation Summary    Left ventricular systolic function is normal. Calculated left ventricular EF = 51%    Left ventricular diastolic function was normal.    Normal RV systolic function.    Mild mitral regurgitation.    Estimated right ventricular systolic pressure from tricuspid regurgitation is normal (<35 mmHg).    Electronically signed by Matteo Mccloud MD, 12/09/24, 1:38 PM EST.       Most Recent Stress Test:  Results for orders placed during the hospital encounter of 12/09/24    Stress Test With Myocardial Perfusion One Day    Interpretation Summary    Myocardial perfusion imaging indicates a normal myocardial perfusion study with no evidence of ischemia.    Left ventricular ejection fraction is normal (Calculated EF = 60%).    Findings consistent with a normal ECG stress test.    Mild breast attenuation artifact is present.    Low risk for ischemic heart disease.    Electronically signed by Matteo Mccloud MD, 12/09/24, 3:57 PM EST.       Most Recent Cardiac Catheterization:   No results found for this or any previous visit.       NOTE: The following portions of the patient's " "note were reviewed, confirmed and/or updated this visit as appropriate: History of present illness/Interval history, physical examination, assessment & plan, allergies, current medications, past family history, past medical history, past social history, past surgical history and problem list.    Labs pertinent to today's visit on 01/21/2025 (including but not limited to CBC, CMP, and lipid profiles) were requested from the patient's primary care provider/hospital/clinical laboratory.  If the labs were available for the visit, they were reviewed with the patient.  If they were not available, when received, special interest will be made to the labs pertinent to this visit.  The patient's most recent \"in-house\" labs are noted below and have been reviewed.  Outside labs pertinent to this visit are scanned into the record and have been reviewed.    Discussions held today, 01/21/2025,regarding procedures included risk, benefits, and options including but not limited to: Death, MI, stroke, pain, bleeding, infection, and possible need for vascular/thoracic/cardiothoracic surgery.    Copied information within this note was reviewed and is current as of 01/21/2025.    Assessment and plan noted herein represents the current plan of care as of 01/21/2025.    Significant resources from our office and staff are inherent in engaging this patient in a continuous and active collaborative plan of care related to their chronic cardiovascular conditions outlined herein.  The management of these conditions requires the direction of our service with specialized clinical knowledge, skills, and experience.  This collaborative care includes but is not limited to patient education, expectations and responsibilities, shared decision making around therapeutic goals, and shared commitments to achieve those goals.  "

## 2025-07-23 ENCOUNTER — OFFICE VISIT (OUTPATIENT)
Dept: CARDIOLOGY | Facility: CLINIC | Age: 75
End: 2025-07-23
Payer: MEDICARE

## 2025-07-23 VITALS
WEIGHT: 142 LBS | HEIGHT: 67 IN | RESPIRATION RATE: 16 BRPM | DIASTOLIC BLOOD PRESSURE: 90 MMHG | BODY MASS INDEX: 22.29 KG/M2 | SYSTOLIC BLOOD PRESSURE: 172 MMHG | OXYGEN SATURATION: 99 % | HEART RATE: 47 BPM

## 2025-07-23 DIAGNOSIS — I10 ESSENTIAL HYPERTENSION: Primary | ICD-10-CM

## 2025-07-23 NOTE — PROGRESS NOTES
Cardiology Office Visit      Encounter Date:  2025    PATIENT IDENTIFICATION    Name: Aparna Wilson  Age: 75 y.o. Sex: female : 1950  MRN: [id number]    Reason For Followup:    Chest pain, elevated blood pressure    Brief Clinical History:  Patient is a 74-year-old female with hypertension, hypertriglyceridemia, CKD stage III, who comes to the cardiology office for follow-up.    Patient was seen back in 2024 when she experienced episodes of chest pain and shortness of breath.  We performed nuclear stress test at that showed no evidence of myocardial ischemia, and TTE showing preserved ejection fraction, mild mitral regurgitation.  In today's visit, patient reports feeling significantly better, denies any recurrent episodes of chest pain, shortness of breath, palpitations, dizziness, lightheadedness, presyncope or syncope.  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    In 2025 patient comes for follow-up appointment.  In today's visit, she reports feeling great, denies any chest pain, shortness of breath, palpitations, dizziness, presyncope or syncope.  She reports significant change in her diet and since event she has noted to have improvement of creatinine levels.  Unfortunately blood pressure was elevated in the office today, 170/90.  Repeat blood pressure later in the visit 150/80.  Patient does not monitor blood pressure closely at home, but states her blood pressure is usually well-controlled during appointments with other providers.    Her current medications include Farxiga, metoprolol, cholecalciferol.    Recent blood work includes total cholesterol 170, LDL 90, triglycerides 188, creatinine 1.26    Prior cardiac workup  TTE 2024    Left ventricular systolic function is normal. Calculated  "left ventricular EF = 51%    Left ventricular diastolic function was normal.    Normal RV systolic function.    Mild mitral regurgitation.    Estimated right ventricular systolic pressure from tricuspid regurgitation is normal (<35 mmHg).    Nuclear stress test Dec 2024    Myocardial perfusion imaging indicates a normal myocardial perfusion study with no evidence of ischemia.    Left ventricular ejection fraction is normal (Calculated EF = 60%).    Findings consistent with a normal ECG stress test.    Mild breast attenuation artifact is present.    Low risk for ischemic heart disease.    EKG in the office today showed sinus bradycardia, heart rate 47 bpm.      Assessment & Plan    Impressions:  Sinus bradycardia, heart rate 47, asymptomatic  Hypertension  Mild hypertriglyceridemia  CKD stage III  Former smoker    Recommendations:  No symptoms from sinus bradycardia, continue current dose of metoprolol.  Advised patient to monitor closely blood pressure at home and follow-up in the next visit in 4 weeks if needed start different antihypertensive medication.  Recommend patient engage in low-sodium diet, regular physical activity.  No further cardiac imaging required at this time.      Objective:    Vitals:  Vitals:    07/23/25 0916   BP: 172/90   BP Location: Right arm   Patient Position: Sitting   Cuff Size: Adult   Pulse: (!) 47   Resp: 16   SpO2: 99%   Weight: 64.4 kg (142 lb)   Height: 170.2 cm (67\")     Body mass index is 22.24 kg/m².      Physical Exam:  General: Alert, cooperative, no distress, appears stated age  Lungs:  Clear to auscultation bilaterally, no wheezes, rhonchi or rales are noted  Chest wall: No tenderness  Heart::  Regular rate and rhythm, S1 and S2 normal, no murmur.  No rub or gallop  Abdomen: Soft, nontender, nondistended, bowel sounds active  Extremities: No cyanosis, clubbing, or edema  Pulses: 2+ and symmetric all extremities  Neuro/psych: No gross focal " deficits        Allergies:  Allergies   Allergen Reactions    Hepatitis B Virus Vaccines Swelling    Penicillins Hives    Tetanus Toxoids Swelling    Nickel Rash       Medication Review:     Current Outpatient Medications:     Cholecalciferol 125 MCG (5000 UT) tablet, Take 1 tablet by mouth Daily., Disp: , Rfl:     Cyanocobalamin (Vitamin B-12) 500 MCG sublingual tablet, Place  under the tongue., Disp: , Rfl:     docusate sodium 100 MG capsule, Take 1 capsule by mouth., Disp: , Rfl:     Farxiga 10 MG tablet, Take 10 mg by mouth Daily., Disp: , Rfl:     metoprolol tartrate (LOPRESSOR) 50 MG tablet, TAKE ONE TABLET BY MOUTH TWICE DAILY, Disp: 60 tablet, Rfl: 0    Family History:  Family History   Problem Relation Age of Onset    No Known Problems Mother     Heart failure Father        Past Medical History:  Past Medical History:   Diagnosis Date    Chronic kidney disease     Herpes zoster     Hypertension 2019    Menopausal syndrome        Past Surgical History:  Past Surgical History:   Procedure Laterality Date    REPLACEMENT TOTAL KNEE Left     THUMB AMPUTATION      ROPING    TOE AMPUTATION          TOTAL ABDOMINAL HYSTERECTOMY WITH SALPINGO OOPHORECTOMY         Social History:  Social History     Socioeconomic History    Marital status:    Tobacco Use    Smoking status: Former     Current packs/day: 0.00     Average packs/day: 0.4 packs/day for 17.6 years (6.3 ttl pk-yrs)     Types: Cigarettes     Start date: 1965     Quit date: 1985     Years since quittin.5     Passive exposure: Past    Smokeless tobacco: Never   Vaping Use    Vaping status: Never Used   Substance and Sexual Activity    Alcohol use: Never    Drug use: Never    Sexual activity: Not Currently     Partners: Male     Birth control/protection: Hysterectomy       Review of Systems:  The following systems were reviewed as they relate to the cardiovascular system: Constitutional, Eyes, ENT, Cardiovascular, Respiratory,  "Gastrointestinal, Integumentary, Neurological, Psychiatric, Hematologic, Endocrine, Musculoskeletal, and Genitourinary. The pertinent cardiovascular findings are reported above with all other cardiovascular points within those systems being negative.      Advance Care Planning   ACP discussion was declined by the patient. Patient has an advance directive (not in EMR), copy requested.       Diagnostic Study Review:     EKG today shows sinus bradycardia, heart rate 47 bpm.  Otherwise normal EKG.  No significant change compared to prior EKG on November 13, 2024.    Laboratory Data:  Lab Results   Component Value Date    GLUCOSE 87 01/14/2025    BUN 15 01/14/2025    CREATININE 1.26 (H) 01/14/2025    EGFRIFNONA 54 (L) 03/24/2021    BCR 11.9 01/14/2025    K 4.5 01/14/2025    CO2 25.0 01/14/2025    CALCIUM 9.0 01/14/2025    ALBUMIN 3.9 01/14/2025    AST 20 01/14/2025    ALT 8 01/14/2025     Lab Results   Component Value Date    GLUCOSE 87 01/14/2025    CALCIUM 9.0 01/14/2025     01/14/2025    K 4.5 01/14/2025    CO2 25.0 01/14/2025     01/14/2025    BUN 15 01/14/2025    CREATININE 1.26 (H) 01/14/2025    EGFRIFNONA 54 (L) 03/24/2021    BCR 11.9 01/14/2025    ANIONGAP 11.0 01/14/2025     Lab Results   Component Value Date    WBC 5.28 01/14/2025    HGB 11.3 (L) 01/14/2025    HCT 35.4 01/14/2025    MCV 98.3 (H) 01/14/2025     01/14/2025     Lab Results   Component Value Date    CHOL 170 03/24/2021    TRIG 188 (H) 03/24/2021    HDL 48 03/24/2021    LDL 90 03/24/2021     No results found for: \"HGBA1C\"  No results found for: \"INR\", \"PROTIME\"    Most Recent Echo:  Results for orders placed during the hospital encounter of 12/09/24    Adult Transthoracic Echo Complete W/ Cont if Necessary Per Protocol 12/09/2024  1:38 PM    Interpretation Summary    Left ventricular systolic function is normal. Calculated left ventricular EF = 51%    Left ventricular diastolic function was normal.    Normal RV systolic " "function.    Mild mitral regurgitation.    Estimated right ventricular systolic pressure from tricuspid regurgitation is normal (<35 mmHg).    Electronically signed by Matteo Mccloud MD, 12/09/24, 1:38 PM EST.       Most Recent Stress Test:  Results for orders placed during the hospital encounter of 12/09/24    Stress Test With Myocardial Perfusion One Day 12/09/2024  3:58 PM    Interpretation Summary    Myocardial perfusion imaging indicates a normal myocardial perfusion study with no evidence of ischemia.    Left ventricular ejection fraction is normal (Calculated EF = 60%).    Findings consistent with a normal ECG stress test.    Mild breast attenuation artifact is present.    Low risk for ischemic heart disease.    Electronically signed by Matteo Mccloud MD, 12/09/24, 3:57 PM EST.       Most Recent Cardiac Catheterization:   No results found for this or any previous visit.       NOTE: The following portions of the patient's note were reviewed, confirmed and/or updated this visit as appropriate: History of present illness/Interval history, physical examination, assessment & plan, allergies, current medications, past family history, past medical history, past social history, past surgical history and problem list.    Labs pertinent to today's visit on 07/23/2025 (including but not limited to CBC, CMP, and lipid profiles) were requested from the patient's primary care provider/hospital/clinical laboratory.  If the labs were available for the visit, they were reviewed with the patient.  If they were not available, when received, special interest will be made to the labs pertinent to this visit.  The patient's most recent \"in-house\" labs are noted below and have been reviewed.  Outside labs pertinent to this visit are scanned into the record and have been reviewed.    Discussions held today, 07/23/2025,regarding procedures included risk, benefits, and options including " but not limited to: Death, MI, stroke, pain, bleeding, infection, and possible need for vascular/thoracic/cardiothoracic surgery.    Copied information within this note was reviewed and is current as of 07/23/2025.    Assessment and plan noted herein represents the current plan of care as of 07/23/2025.    Significant resources from our office and staff are inherent in engaging this patient in a continuous and active collaborative plan of care related to their chronic cardiovascular conditions outlined herein.  The management of these conditions requires the direction of our service with specialized clinical knowledge, skills, and experience.  This collaborative care includes but is not limited to patient education, expectations and responsibilities, shared decision making around therapeutic goals, and shared commitments to achieve those goals.

## 2025-08-27 ENCOUNTER — OFFICE VISIT (OUTPATIENT)
Dept: CARDIOLOGY | Facility: CLINIC | Age: 75
End: 2025-08-27
Payer: MEDICARE

## 2025-08-27 VITALS
BODY MASS INDEX: 22.13 KG/M2 | DIASTOLIC BLOOD PRESSURE: 79 MMHG | HEART RATE: 54 BPM | HEIGHT: 67 IN | SYSTOLIC BLOOD PRESSURE: 124 MMHG | OXYGEN SATURATION: 98 % | WEIGHT: 141 LBS

## 2025-08-27 DIAGNOSIS — I10 ESSENTIAL HYPERTENSION: Primary | ICD-10-CM
